# Patient Record
Sex: MALE | Race: WHITE | Employment: FULL TIME | URBAN - METROPOLITAN AREA
[De-identification: names, ages, dates, MRNs, and addresses within clinical notes are randomized per-mention and may not be internally consistent; named-entity substitution may affect disease eponyms.]

---

## 2018-09-11 ENCOUNTER — OFFICE VISIT (OUTPATIENT)
Dept: SLEEP CENTER | Facility: HOSPITAL | Age: 48
End: 2018-09-11
Attending: INTERNAL MEDICINE
Payer: COMMERCIAL

## 2018-09-11 DIAGNOSIS — R06.81 APNEA: ICD-10-CM

## 2018-09-11 PROCEDURE — 95810 POLYSOM 6/> YRS 4/> PARAM: CPT

## 2018-09-24 NOTE — PROCEDURES
[address]       Accredited by the [facility] ([facility])    PATIENT'S NAME:        Pamela Horan  ATTENDING PHYSICIAN:   Bharti Burrell M.D.   REFERRING PHYSICIAN:   Dr. Marah Villanueva During sleep, all stages of sleep were seen. Slow-wave sleep comprised 45.2% of total sleep time. REM sleep occupied 11.6% of total sleep time with a REM latency of 231.9 minutes. There were a few awakenings during the night.     Sleep-disordered breathi vigilance. Thank you for your confidence in the [facility]. If you have any questions, please feel free to contact us at [phone].     Dictated By Sadie Mustafa M.D.  d: 09/23/2018 20:31:56  t: 09/23/2018 20:37:58  Job [id number]  NS

## 2018-11-12 ENCOUNTER — OFFICE VISIT (OUTPATIENT)
Dept: SLEEP CENTER | Facility: HOSPITAL | Age: 48
End: 2018-11-12
Attending: INTERNAL MEDICINE
Payer: COMMERCIAL

## 2018-11-12 DIAGNOSIS — R06.81 APNEA: ICD-10-CM

## 2018-11-12 PROCEDURE — 95811 POLYSOM 6/>YRS CPAP 4/> PARM: CPT

## 2018-11-19 NOTE — PROCEDURES
[address] [address]       Accredited by the [facility] ([facility]ASM)    PATIENT'S NAME:        Eleanora Bumpers  ATTENDING PHYSICIAN:   Sundar Rooney M.D.   REFERRING PHYSICIAN:   Dr. Joyce Pizarro documented via technician notes every 15 minutes. There is an additional channel for measurement of CPAP flow for the titration of positive airway pressure.     SLEEP ARCHITECTURE:  Total sleep time 430 minutes, total sleep period 430 minutes, with sleep ef used with level 3 humidity and C-flex level 2. Close followup to assure adequate treatment of obstructive sleep apnea and adequate compliance is recommended.       Patient will benefit from a structured weight loss program.    Patient will follow up wi

## 2019-06-16 ENCOUNTER — APPOINTMENT (OUTPATIENT)
Dept: CT IMAGING | Facility: HOSPITAL | Age: 49
End: 2019-06-16
Attending: EMERGENCY MEDICINE
Payer: COMMERCIAL

## 2019-06-16 ENCOUNTER — HOSPITAL ENCOUNTER (OUTPATIENT)
Age: 49
Discharge: ANOTHER HOSPITAL | End: 2019-06-16
Attending: FAMILY MEDICINE
Payer: COMMERCIAL

## 2019-06-16 ENCOUNTER — HOSPITAL ENCOUNTER (EMERGENCY)
Facility: HOSPITAL | Age: 49
Discharge: HOME | End: 2019-06-16
Attending: EMERGENCY MEDICINE
Payer: COMMERCIAL

## 2019-06-16 ENCOUNTER — APPOINTMENT (OUTPATIENT)
Dept: GENERAL RADIOLOGY | Age: 49
End: 2019-06-16
Attending: FAMILY MEDICINE
Payer: COMMERCIAL

## 2019-06-16 VITALS
HEIGHT: 71 IN | BODY MASS INDEX: 37.94 KG/M2 | DIASTOLIC BLOOD PRESSURE: 83 MMHG | HEART RATE: 87 BPM | TEMPERATURE: 97 F | OXYGEN SATURATION: 97 % | WEIGHT: 271 LBS | RESPIRATION RATE: 20 BRPM | SYSTOLIC BLOOD PRESSURE: 132 MMHG

## 2019-06-16 VITALS
RESPIRATION RATE: 16 BRPM | WEIGHT: 271 LBS | SYSTOLIC BLOOD PRESSURE: 138 MMHG | DIASTOLIC BLOOD PRESSURE: 84 MMHG | HEART RATE: 83 BPM | TEMPERATURE: 99 F | HEIGHT: 71 IN | BODY MASS INDEX: 37.94 KG/M2 | OXYGEN SATURATION: 98 %

## 2019-06-16 DIAGNOSIS — R13.10 DYSPHAGIA, UNSPECIFIED TYPE: Primary | ICD-10-CM

## 2019-06-16 DIAGNOSIS — R19.8 SENSATION OF FOREIGN BODY IN ESOPHAGUS: Primary | ICD-10-CM

## 2019-06-16 PROCEDURE — 99203 OFFICE O/P NEW LOW 30 MIN: CPT

## 2019-06-16 PROCEDURE — 96361 HYDRATE IV INFUSION ADD-ON: CPT

## 2019-06-16 PROCEDURE — 85025 COMPLETE CBC W/AUTO DIFF WBC: CPT | Performed by: EMERGENCY MEDICINE

## 2019-06-16 PROCEDURE — 70491 CT SOFT TISSUE NECK W/DYE: CPT | Performed by: EMERGENCY MEDICINE

## 2019-06-16 PROCEDURE — 80053 COMPREHEN METABOLIC PANEL: CPT | Performed by: EMERGENCY MEDICINE

## 2019-06-16 PROCEDURE — 99285 EMERGENCY DEPT VISIT HI MDM: CPT

## 2019-06-16 PROCEDURE — 99213 OFFICE O/P EST LOW 20 MIN: CPT

## 2019-06-16 PROCEDURE — 96360 HYDRATION IV INFUSION INIT: CPT

## 2019-06-16 PROCEDURE — 70360 X-RAY EXAM OF NECK: CPT | Performed by: FAMILY MEDICINE

## 2019-06-16 PROCEDURE — 99284 EMERGENCY DEPT VISIT MOD MDM: CPT

## 2019-06-16 RX ORDER — OMEPRAZOLE 20 MG/1
20 CAPSULE, DELAYED RELEASE ORAL DAILY
Qty: 30 CAPSULE | Refills: 0 | Status: SHIPPED | OUTPATIENT
Start: 2019-06-16 | End: 2019-07-16

## 2019-06-16 RX ORDER — TAMSULOSIN HYDROCHLORIDE 0.4 MG/1
0.4 CAPSULE ORAL DAILY
COMMUNITY

## 2019-06-16 NOTE — ED INITIAL ASSESSMENT (HPI)
Patient sent from [address] [place] [address] for further work up of soft tissue swelling to neck. States around 3 weeks ago developed feeling of something stuck in throat after eating popcorn. Had similar feeling today after eating chips/burger.  Patient has been able to swallow w

## 2019-06-16 NOTE — ED INITIAL ASSESSMENT (HPI)
Pt presents today with c/o possible food stuck in back of throat. Pt states that he ate popcorn x 3 weeks ago and felt a piece had not gone down all the way. Pt has continued to feel the piece stuck in the back of his throat since that time.  Pt states that

## 2019-06-16 NOTE — ED PROVIDER NOTES
Patient Seen in: [facility] Emergency Department    History   Patient presents with:  FB in Throat (GI, respiratory)    Stated Complaint: burger and chips stuck in throat.  airway patent    HPI    Pleasant 61-year-old presents to the emergency depart 135/80   Pulse 83   Resp 18   Temp 98.5 °F (36.9 °C)   Temp src Temporal   SpO2 99 %   O2 Device None (Room air)       Current:/84   Pulse 83   Temp 98.5 °F (36.9 °C) (Temporal)   Resp 16   Ht 180.3 cm (5' 11\")   Wt 122.9 kg   SpO2 98%   BMI 37.80 k like more of an esophageal stricture    MDM   CT is benign. We will schedule him to follow-up with gastroenterology for likely endoscopy for symptoms.   I have discussed with the patient he is quite stable here in the emergency department no further interv

## 2019-06-16 NOTE — ED PROVIDER NOTES
Patient Seen in: [facility]    History   Patient presents with:  Choking (respiratory)    Stated Complaint: Food caught in back of throat    HPI    61-year-old male presents with complaints of food stuck in the back of his throa no cervical lymphadenopathy.   Lungs clear to auscultation bilaterally  Heart S1-S2 heard no murmurs no gallops  Skin shows no rash        ED Course   Labs Reviewed - No data to display       Xr Soft Tissue Neck (cpt=70360)    Result Date: 6/16/2019  PROCED

## 2019-06-17 NOTE — ED NOTES
Report given to Clarisse Caputo RN at this time. Patient has returned from CT at this time. Assisted to bathroom. Ambulatory with steady gait.

## 2019-06-19 PROBLEM — E11.9 CONTROLLED TYPE 2 DIABETES MELLITUS WITHOUT COMPLICATION, WITHOUT LONG-TERM CURRENT USE OF INSULIN (HCC): Status: ACTIVE | Noted: 2018-09-28

## 2019-06-19 PROBLEM — R13.19 ESOPHAGEAL DYSPHAGIA: Status: ACTIVE | Noted: 2019-06-19

## 2019-06-19 PROBLEM — R39.9 LOWER URINARY TRACT SYMPTOMS (LUTS): Status: ACTIVE | Noted: 2018-09-28

## 2019-06-19 PROBLEM — G47.33 OSA (OBSTRUCTIVE SLEEP APNEA): Status: ACTIVE | Noted: 2017-09-27

## 2019-06-20 RX ORDER — MULTIVIT WITH MINERALS/LUTEIN
1000 TABLET ORAL DAILY
COMMUNITY

## 2019-07-02 ENCOUNTER — ANESTHESIA EVENT (OUTPATIENT)
Dept: ENDOSCOPY | Facility: HOSPITAL | Age: 49
End: 2019-07-02
Payer: COMMERCIAL

## 2019-07-02 ENCOUNTER — HOSPITAL ENCOUNTER (OUTPATIENT)
Facility: HOSPITAL | Age: 49
Setting detail: HOSPITAL OUTPATIENT SURGERY
Discharge: HOME | End: 2019-07-02
Attending: INTERNAL MEDICINE | Admitting: INTERNAL MEDICINE
Payer: COMMERCIAL

## 2019-07-02 ENCOUNTER — ANESTHESIA (OUTPATIENT)
Dept: ENDOSCOPY | Facility: HOSPITAL | Age: 49
End: 2019-07-02
Payer: COMMERCIAL

## 2019-07-02 VITALS
RESPIRATION RATE: 18 BRPM | HEART RATE: 78 BPM | SYSTOLIC BLOOD PRESSURE: 126 MMHG | TEMPERATURE: 98 F | WEIGHT: 275 LBS | OXYGEN SATURATION: 99 % | BODY MASS INDEX: 38.5 KG/M2 | DIASTOLIC BLOOD PRESSURE: 66 MMHG | HEIGHT: 71 IN

## 2019-07-02 DIAGNOSIS — R13.19 ESOPHAGEAL DYSPHAGIA: ICD-10-CM

## 2019-07-02 LAB — GLUCOSE BLD-MCNC: 125 MG/DL (ref 70–99)

## 2019-07-02 PROCEDURE — 88342 IMHCHEM/IMCYTCHM 1ST ANTB: CPT | Performed by: INTERNAL MEDICINE

## 2019-07-02 PROCEDURE — 0DB78ZX EXCISION OF STOMACH, PYLORUS, VIA NATURAL OR ARTIFICIAL OPENING ENDOSCOPIC, DIAGNOSTIC: ICD-10-PCS | Performed by: INTERNAL MEDICINE

## 2019-07-02 PROCEDURE — 0DB58ZX EXCISION OF ESOPHAGUS, VIA NATURAL OR ARTIFICIAL OPENING ENDOSCOPIC, DIAGNOSTIC: ICD-10-PCS | Performed by: INTERNAL MEDICINE

## 2019-07-02 PROCEDURE — 88305 TISSUE EXAM BY PATHOLOGIST: CPT | Performed by: INTERNAL MEDICINE

## 2019-07-02 PROCEDURE — 0DB98ZX EXCISION OF DUODENUM, VIA NATURAL OR ARTIFICIAL OPENING ENDOSCOPIC, DIAGNOSTIC: ICD-10-PCS | Performed by: INTERNAL MEDICINE

## 2019-07-02 PROCEDURE — 82962 GLUCOSE BLOOD TEST: CPT

## 2019-07-02 PROCEDURE — 0DB48ZX EXCISION OF ESOPHAGOGASTRIC JUNCTION, VIA NATURAL OR ARTIFICIAL OPENING ENDOSCOPIC, DIAGNOSTIC: ICD-10-PCS | Performed by: INTERNAL MEDICINE

## 2019-07-02 RX ORDER — NALOXONE HYDROCHLORIDE 0.4 MG/ML
80 INJECTION, SOLUTION INTRAMUSCULAR; INTRAVENOUS; SUBCUTANEOUS AS NEEDED
Status: DISCONTINUED | OUTPATIENT
Start: 2019-07-02 | End: 2019-07-02

## 2019-07-02 RX ORDER — DEXTROSE MONOHYDRATE 25 G/50ML
50 INJECTION, SOLUTION INTRAVENOUS
Status: DISCONTINUED | OUTPATIENT
Start: 2019-07-02 | End: 2019-07-02

## 2019-07-02 RX ORDER — SODIUM CHLORIDE, SODIUM LACTATE, POTASSIUM CHLORIDE, CALCIUM CHLORIDE 600; 310; 30; 20 MG/100ML; MG/100ML; MG/100ML; MG/100ML
INJECTION, SOLUTION INTRAVENOUS CONTINUOUS
Status: DISCONTINUED | OUTPATIENT
Start: 2019-07-02 | End: 2019-07-02

## 2019-07-02 NOTE — OPERATIVE REPORT
Shauna Barillas Patient Status:  Hospital Outpatient Surgery    1970 MRN [id number]   [facility] ENDOSCOPY Attending Severo Posey, MD   Date 2019 PCP Venessa Nova MD     PREOPERATIVE DIAGNOSIS/INDICATION: OPD  POSTOPERTATIVE

## 2019-07-02 NOTE — ANESTHESIA PREPROCEDURE EVALUATION
PRE-OP EVALUATION    Patient Name: Suukmar Alonzo    Pre-op Diagnosis: Esophageal dysphagia [R13.10]    Procedure(s):  ESOPHAGOGASTRODUODENOSCOPY    Surgeon(s) and Role:     Lyle Terry MD - Primary    Pre-op vitals reviewed.   Temp: 98.1 °F (36.7 removed from center of back    • OTHER      Sedated to set nasal fracture     Social History    Tobacco Use      Smoking status: Never Smoker      Smokeless tobacco: Never Used    Alcohol use: Never      Frequency: Never      Drug use: Unknown     Availabl

## 2019-07-02 NOTE — H&P
[address] Patient Status:  Hospital Outpatient Surgery    1970 MRN [id number]   [facility] ENDOSCOPY Attending Idalmis Xiong MD   Date 2019 PCP Julianne Frye MD     CC: Dysphagia    Hist Normal.  CV: S1 and S2 normal.    Lungs: Clear to auscultation. Abdomen: Soft and nondistended. Nontender. No masses. Bowel sounds are present. Extremities: No edema, cyanosis, or clubbing. Skin: Warm and dry.   Laboratory Data:  Lab Results   Compone

## 2019-07-02 NOTE — ANESTHESIA POSTPROCEDURE EVALUATION
[address] Patient Status:  Hospital Outpatient Surgery   Age/Gender 57year old male MRN [id number]   Location [facility]. Attending Kelley Robles, [address] [facility] [address] Day # 0 PCP Romayne Gilles, MD       Anesthesia Post-op Note

## 2021-01-20 ENCOUNTER — HOSPITAL ENCOUNTER (EMERGENCY)
Facility: HOSPITAL | Age: 51
Discharge: HOME | End: 2021-01-20
Attending: EMERGENCY MEDICINE
Payer: COMMERCIAL

## 2021-01-20 ENCOUNTER — APPOINTMENT (OUTPATIENT)
Dept: CT IMAGING | Facility: HOSPITAL | Age: 51
End: 2021-01-20
Attending: EMERGENCY MEDICINE
Payer: COMMERCIAL

## 2021-01-20 VITALS
RESPIRATION RATE: 16 BRPM | HEIGHT: 72 IN | BODY MASS INDEX: 24.24 KG/M2 | DIASTOLIC BLOOD PRESSURE: 72 MMHG | HEART RATE: 78 BPM | TEMPERATURE: 99 F | WEIGHT: 179 LBS | OXYGEN SATURATION: 91 % | SYSTOLIC BLOOD PRESSURE: 135 MMHG

## 2021-01-20 DIAGNOSIS — N20.0 KIDNEY STONE: Primary | ICD-10-CM

## 2021-01-20 LAB
ALBUMIN SERPL-MCNC: 3.9 G/DL (ref 3.4–5)
ALBUMIN/GLOB SERPL: 1 {RATIO} (ref 1–2)
ALP LIVER SERPL-CCNC: 82 U/L
ALT SERPL-CCNC: 45 U/L
ANION GAP SERPL CALC-SCNC: 6 MMOL/L (ref 0–18)
AST SERPL-CCNC: 21 U/L (ref 15–37)
BASOPHILS # BLD AUTO: 0.03 X10(3) UL (ref 0–0.2)
BASOPHILS NFR BLD AUTO: 0.3 %
BILIRUB SERPL-MCNC: 0.3 MG/DL (ref 0.1–2)
BILIRUB UR QL STRIP.AUTO: NEGATIVE
BUN BLD-MCNC: 15 MG/DL (ref 7–18)
BUN/CREAT SERPL: 12 (ref 10–20)
CALCIUM BLD-MCNC: 9.2 MG/DL (ref 8.5–10.1)
CHLORIDE SERPL-SCNC: 104 MMOL/L (ref 98–112)
CLARITY UR REFRACT.AUTO: CLEAR
CO2 SERPL-SCNC: 25 MMOL/L (ref 21–32)
COLOR UR AUTO: YELLOW
CREAT BLD-MCNC: 1.25 MG/DL
DEPRECATED RDW RBC AUTO: 37.7 FL (ref 35.1–46.3)
EOSINOPHIL # BLD AUTO: 0.06 X10(3) UL (ref 0–0.7)
EOSINOPHIL NFR BLD AUTO: 0.6 %
ERYTHROCYTE [DISTWIDTH] IN BLOOD BY AUTOMATED COUNT: 13.2 % (ref 11–15)
GLOBULIN PLAS-MCNC: 3.8 G/DL (ref 2.8–4.4)
GLUCOSE BLD-MCNC: 200 MG/DL (ref 70–99)
GLUCOSE UR STRIP.AUTO-MCNC: >=500 MG/DL
HCT VFR BLD AUTO: 44 %
HGB BLD-MCNC: 14.7 G/DL
IMM GRANULOCYTES # BLD AUTO: 0.04 X10(3) UL (ref 0–1)
IMM GRANULOCYTES NFR BLD: 0.4 %
LEUKOCYTE ESTERASE UR QL STRIP.AUTO: NEGATIVE
LIPASE SERPL-CCNC: 211 U/L (ref 73–393)
LYMPHOCYTES # BLD AUTO: 1 X10(3) UL (ref 1–4)
LYMPHOCYTES NFR BLD AUTO: 9.8 %
M PROTEIN MFR SERPL ELPH: 7.7 G/DL (ref 6.4–8.2)
MCH RBC QN AUTO: 26.9 PG (ref 26–34)
MCHC RBC AUTO-ENTMCNC: 33.4 G/DL (ref 31–37)
MCV RBC AUTO: 80.4 FL
MONOCYTES # BLD AUTO: 0.7 X10(3) UL (ref 0.1–1)
MONOCYTES NFR BLD AUTO: 6.9 %
NEUTROPHILS # BLD AUTO: 8.38 X10 (3) UL (ref 1.5–7.7)
NEUTROPHILS # BLD AUTO: 8.38 X10(3) UL (ref 1.5–7.7)
NEUTROPHILS NFR BLD AUTO: 82 %
NITRITE UR QL STRIP.AUTO: NEGATIVE
OSMOLALITY SERPL CALC.SUM OF ELEC: 286 MOSM/KG (ref 275–295)
PH UR STRIP.AUTO: 6 [PH] (ref 4.5–8)
PLATELET # BLD AUTO: 198 10(3)UL (ref 150–450)
POTASSIUM SERPL-SCNC: 4.1 MMOL/L (ref 3.5–5.1)
PROT UR STRIP.AUTO-MCNC: NEGATIVE MG/DL
RBC # BLD AUTO: 5.47 X10(6)UL
SODIUM SERPL-SCNC: 135 MMOL/L (ref 136–145)
SP GR UR STRIP.AUTO: 1.01 (ref 1–1.03)
UROBILINOGEN UR STRIP.AUTO-MCNC: <2 MG/DL
WBC # BLD AUTO: 10.2 X10(3) UL (ref 4–11)

## 2021-01-20 PROCEDURE — 85025 COMPLETE CBC W/AUTO DIFF WBC: CPT | Performed by: EMERGENCY MEDICINE

## 2021-01-20 PROCEDURE — 96375 TX/PRO/DX INJ NEW DRUG ADDON: CPT

## 2021-01-20 PROCEDURE — 99284 EMERGENCY DEPT VISIT MOD MDM: CPT

## 2021-01-20 PROCEDURE — 83690 ASSAY OF LIPASE: CPT | Performed by: EMERGENCY MEDICINE

## 2021-01-20 PROCEDURE — 80053 COMPREHEN METABOLIC PANEL: CPT | Performed by: EMERGENCY MEDICINE

## 2021-01-20 PROCEDURE — 96374 THER/PROPH/DIAG INJ IV PUSH: CPT

## 2021-01-20 PROCEDURE — 81001 URINALYSIS AUTO W/SCOPE: CPT | Performed by: EMERGENCY MEDICINE

## 2021-01-20 PROCEDURE — 74176 CT ABD & PELVIS W/O CONTRAST: CPT | Performed by: EMERGENCY MEDICINE

## 2021-01-20 PROCEDURE — 99285 EMERGENCY DEPT VISIT HI MDM: CPT

## 2021-01-20 RX ORDER — GLIPIZIDE 10 MG/1
10 TABLET, FILM COATED, EXTENDED RELEASE ORAL DAILY
COMMUNITY

## 2021-01-20 RX ORDER — ONDANSETRON 4 MG/1
4 TABLET, ORALLY DISINTEGRATING ORAL EVERY 4 HOURS PRN
Qty: 20 TABLET | Refills: 0 | Status: SHIPPED | OUTPATIENT
Start: 2021-01-20 | End: 2021-01-25

## 2021-01-20 RX ORDER — ONDANSETRON 2 MG/ML
4 INJECTION INTRAMUSCULAR; INTRAVENOUS ONCE
Status: COMPLETED | OUTPATIENT
Start: 2021-01-20 | End: 2021-01-20

## 2021-01-20 RX ORDER — KETOROLAC TROMETHAMINE 30 MG/ML
15 INJECTION, SOLUTION INTRAMUSCULAR; INTRAVENOUS ONCE
Status: COMPLETED | OUTPATIENT
Start: 2021-01-20 | End: 2021-01-20

## 2021-01-20 RX ORDER — HYDROCODONE BITARTRATE AND ACETAMINOPHEN 5; 325 MG/1; MG/1
1-2 TABLET ORAL EVERY 6 HOURS PRN
Qty: 20 TABLET | Refills: 0 | Status: SHIPPED | OUTPATIENT
Start: 2021-01-20 | End: 2021-01-23

## 2021-01-20 RX ORDER — HYDROMORPHONE HYDROCHLORIDE 1 MG/ML
1 INJECTION, SOLUTION INTRAMUSCULAR; INTRAVENOUS; SUBCUTANEOUS EVERY 30 MIN PRN
Status: DISCONTINUED | OUTPATIENT
Start: 2021-01-20 | End: 2021-01-20

## 2021-01-20 NOTE — ED PROVIDER NOTES
Patient Seen in: [facility] Emergency Department      History   Patient presents with:  Abdomen/Flank Pain    Stated Complaint: flank pain, possible stone    HPI/Subjective:   HPI    Patient is a 53-year-old male comes emergency room for evaluation rhonchi. CARDIOVASCULAR: Regular rate and rhythm. Normal S1S2. No S3S4 or murmur. ABDOMEN: Bowel sounds are present. Soft. nondistended, no pulsatile masses. nontender  Back: Patient has no CVA tenderness.   Mild tenderness to the mid axillary line righ ABDOMEN+PELVIS KIDNEYSTONE 2D RNDR(NO IV,NO ORAL)(CPT=74176)  COMPARISON:  [facility] , CT, CT ABD/PLV(S) Rosaura Part W/3D, 4/12/2010, 3:01 PM.  INDICATIONS:  flank pain, possible stone  TECHNIQUE:  Unenhanced multislice CT scanning from above the kidneys to b throughout the right kidney are noted. A representative 9 mm calculus in the upper pole the right kidney is noted.     Dictated by (CST): Diane Carty MD on 1/20/2021 at 10:55 AM     Finalized by (CST): Diane Carty MD on 1/20/2021 at 10:57 AM primary care provider for further evaluation and treatment, but to return immediately to the ER for worsening, concerning, new, changing or persisting symptoms. I discussed the case with the patient and they had no questions, complaints, or concerns.   Juhi Decker

## 2022-01-03 ENCOUNTER — APPOINTMENT (OUTPATIENT)
Dept: GENERAL RADIOLOGY | Age: 52
End: 2022-01-03
Attending: EMERGENCY MEDICINE
Payer: COMMERCIAL

## 2022-01-03 ENCOUNTER — HOSPITAL ENCOUNTER (OUTPATIENT)
Age: 52
Discharge: HOME | End: 2022-01-03
Attending: EMERGENCY MEDICINE
Payer: COMMERCIAL

## 2022-01-03 VITALS
RESPIRATION RATE: 20 BRPM | SYSTOLIC BLOOD PRESSURE: 130 MMHG | OXYGEN SATURATION: 99 % | WEIGHT: 285 LBS | TEMPERATURE: 98 F | HEIGHT: 72 IN | HEART RATE: 78 BPM | BODY MASS INDEX: 38.6 KG/M2 | DIASTOLIC BLOOD PRESSURE: 80 MMHG

## 2022-01-03 DIAGNOSIS — B34.9 VIRAL SYNDROME: Primary | ICD-10-CM

## 2022-01-03 PROCEDURE — 71046 X-RAY EXAM CHEST 2 VIEWS: CPT | Performed by: EMERGENCY MEDICINE

## 2022-01-03 PROCEDURE — 99213 OFFICE O/P EST LOW 20 MIN: CPT

## 2022-01-03 NOTE — ED INITIAL ASSESSMENT (HPI)
Pt aox4. Pt c/o sough stared last Monday. Pt states had fatigue and loss of appetite weds and Thursday. Pt denies fatigue and loss of appetite.  Pt vaccinated for Covid

## 2022-01-03 NOTE — ED PROVIDER NOTES
Patient Seen in: [facility]      History   Patient presents with:  Cough/URI    Stated Complaint: Severe Cough    Subjective:   HPI    Patient is a pleasant 24-year-old male, presenting for evaluation of a persistent cough.     Patient bec Head is without evidence of trauma. Extraocular muscles are intact. Pupils are equal and reactive to light. Oropharynx is pink and moist.  NECK: Neck is supple and nontender. The trachea is midline.    LUNGS: Lungs are clear to auscultation bilaterally, res intervention may be required, depending on the patient's clinical course. Patient verbalizes understanding and is comfortable with the plan as recommended. Patient ambulated freely and was subsequently discharged without incident.

## 2022-01-05 LAB — SARS-COV-2 RNA RESP QL NAA+PROBE: NOT DETECTED

## 2022-02-21 ENCOUNTER — CLINICAL ABSTRACT (OUTPATIENT)
Dept: PHARMACY | Age: 52
End: 2022-02-21

## 2022-02-23 ENCOUNTER — CLINICAL ABSTRACT (OUTPATIENT)
Dept: PHARMACY | Age: 52
End: 2022-02-23

## 2022-02-28 ENCOUNTER — TELEPHONE (OUTPATIENT)
Dept: PHARMACY | Age: 52
End: 2022-02-28

## 2022-03-01 RX ORDER — TAMSULOSIN HYDROCHLORIDE 0.4 MG/1
0.4 CAPSULE ORAL DAILY
COMMUNITY

## 2022-03-01 RX ORDER — TRIAMCINOLONE ACETONIDE 1 MG/G
OINTMENT TOPICAL 2 TIMES DAILY
COMMUNITY

## 2022-03-01 RX ORDER — DIAPER,BRIEF,INFANT-TODD,DISP
EACH MISCELLANEOUS 2 TIMES DAILY
COMMUNITY

## 2022-03-25 ENCOUNTER — CLINICAL ABSTRACT (OUTPATIENT)
Dept: PHARMACY | Age: 52
End: 2022-03-25

## 2022-04-25 ENCOUNTER — CLINICAL ABSTRACT (OUTPATIENT)
Dept: PHARMACY | Age: 52
End: 2022-04-25

## 2022-04-26 ENCOUNTER — TELEPHONE (OUTPATIENT)
Dept: PHARMACY | Age: 52
End: 2022-04-26

## 2022-05-20 ENCOUNTER — CLINICAL ABSTRACT (OUTPATIENT)
Dept: PHARMACY | Age: 52
End: 2022-05-20

## 2022-05-23 ENCOUNTER — TELEPHONE (OUTPATIENT)
Dept: PHARMACY | Age: 52
End: 2022-05-23

## 2022-06-22 ENCOUNTER — CLINICAL ABSTRACT (OUTPATIENT)
Dept: PHARMACY | Age: 52
End: 2022-06-22

## 2022-07-05 ENCOUNTER — CLINICAL ABSTRACT (OUTPATIENT)
Dept: PHARMACY | Age: 52
End: 2022-07-05

## 2022-07-21 ENCOUNTER — TELEPHONE (OUTPATIENT)
Dept: PHARMACY | Age: 52
End: 2022-07-21

## 2022-08-18 ENCOUNTER — TELEPHONE (OUTPATIENT)
Dept: PHARMACY | Age: 52
End: 2022-08-18

## 2022-08-18 DIAGNOSIS — L40.9 PSORIASIS: Primary | ICD-10-CM

## 2022-08-18 DIAGNOSIS — K74.60 CIRRHOSIS (CMD): ICD-10-CM

## 2022-09-09 ENCOUNTER — CLINICAL ABSTRACT (OUTPATIENT)
Dept: PHARMACY | Age: 52
End: 2022-09-09

## 2022-09-12 ENCOUNTER — TELEPHONE (OUTPATIENT)
Dept: PHARMACY | Age: 52
End: 2022-09-12

## 2022-09-30 ENCOUNTER — CLINICAL ABSTRACT (OUTPATIENT)
Dept: PHARMACY | Age: 52
End: 2022-09-30

## 2022-10-06 ENCOUNTER — TELEPHONE (OUTPATIENT)
Dept: PHARMACY | Age: 52
End: 2022-10-06

## 2022-10-25 ENCOUNTER — CLINICAL ABSTRACT (OUTPATIENT)
Dept: PHARMACY | Age: 52
End: 2022-10-25

## 2022-10-28 ENCOUNTER — TELEPHONE (OUTPATIENT)
Dept: PHARMACY | Age: 52
End: 2022-10-28

## 2022-11-18 ENCOUNTER — CLINICAL ABSTRACT (OUTPATIENT)
Dept: PHARMACY | Age: 52
End: 2022-11-18

## 2022-11-29 ENCOUNTER — TELEPHONE (OUTPATIENT)
Dept: PHARMACY | Age: 52
End: 2022-11-29

## 2022-12-08 ENCOUNTER — CLINICAL ABSTRACT (OUTPATIENT)
Dept: PHARMACY | Age: 52
End: 2022-12-08

## 2022-12-09 ENCOUNTER — CLINICAL ABSTRACT (OUTPATIENT)
Dept: PHARMACY | Age: 52
End: 2022-12-09

## 2022-12-09 ENCOUNTER — TELEPHONE (OUTPATIENT)
Dept: PHARMACY | Age: 52
End: 2022-12-09

## 2022-12-29 ENCOUNTER — CLINICAL ABSTRACT (OUTPATIENT)
Dept: PHARMACY | Age: 52
End: 2022-12-29

## 2023-01-03 ENCOUNTER — TELEPHONE (OUTPATIENT)
Dept: PHARMACY | Age: 53
End: 2023-01-03

## 2023-01-12 LAB — HBA1C MFR BLD: 6.6 %

## 2023-01-20 ENCOUNTER — CLINICAL ABSTRACT (OUTPATIENT)
Dept: PHARMACY | Age: 53
End: 2023-01-20

## 2023-01-25 ENCOUNTER — TELEPHONE (OUTPATIENT)
Dept: PHARMACY | Age: 53
End: 2023-01-25

## 2023-02-13 ENCOUNTER — TELEPHONE (OUTPATIENT)
Dept: PHARMACY | Age: 53
End: 2023-02-13

## 2023-06-15 ENCOUNTER — CLINICAL ABSTRACT (OUTPATIENT)
Dept: CARE COORDINATION | Age: 53
End: 2023-06-15

## 2024-01-23 ENCOUNTER — OFFICE VISIT (OUTPATIENT)
Facility: CLINIC | Age: 54
End: 2024-01-23
Payer: COMMERCIAL

## 2024-01-23 VITALS
DIASTOLIC BLOOD PRESSURE: 80 MMHG | OXYGEN SATURATION: 99 % | RESPIRATION RATE: 18 BRPM | BODY MASS INDEX: 37.65 KG/M2 | WEIGHT: 278 LBS | HEART RATE: 102 BPM | HEIGHT: 72 IN | SYSTOLIC BLOOD PRESSURE: 128 MMHG

## 2024-01-23 DIAGNOSIS — E11.9 CONTROLLED TYPE 2 DIABETES MELLITUS WITHOUT COMPLICATION, WITHOUT LONG-TERM CURRENT USE OF INSULIN (HCC): ICD-10-CM

## 2024-01-23 DIAGNOSIS — G47.10 HYPERSOMNIA: ICD-10-CM

## 2024-01-23 DIAGNOSIS — G47.33 OSA (OBSTRUCTIVE SLEEP APNEA): Primary | ICD-10-CM

## 2024-01-23 DIAGNOSIS — E66.09 CLASS 2 OBESITY DUE TO EXCESS CALORIES WITHOUT SERIOUS COMORBIDITY WITH BODY MASS INDEX (BMI) OF 37.0 TO 37.9 IN ADULT: ICD-10-CM

## 2024-01-23 PROCEDURE — 99204 OFFICE O/P NEW MOD 45 MIN: CPT | Performed by: OTHER

## 2024-01-23 PROCEDURE — 3074F SYST BP LT 130 MM HG: CPT | Performed by: OTHER

## 2024-01-23 PROCEDURE — 3079F DIAST BP 80-89 MM HG: CPT | Performed by: OTHER

## 2024-01-23 PROCEDURE — 3008F BODY MASS INDEX DOCD: CPT | Performed by: OTHER

## 2024-01-23 NOTE — PROGRESS NOTES
[facility] PULMONARY  SLEEP PROGRESS NOTE        HPI:   This is a 53 year old male coming in for   Chief Complaint   Patient presents with    Obstructive Sleep Apnea (ANIL)     DME:[employer]  MASK:full mask  DEVICE;over 5 yrs        HPI:     This is a 53 year old male who presents with the following symptoms, risk factors, behaviors or other items associated with sleep problems.    Sleep Apnea:   No data recorded  Insomnia:  No data recorded  Restless Leg:  No data recorded  Parasomnias:   No data recorded  Daytime Problems:  No data recorded    Patient underwent diagnostic polysomnography on 2018.  This study showed severe obstructive sleep apnea syndrome, with overall apnea-hypopnea index of 47 events per hour, supine index 107 events per hour, REM index 67 events per hour, with mary O2 saturation 81%.  Also elevated periodic limb movements of sleep were noted, but PLMS were not associated with arousals.         The patient's [place] Sleepiness score is No data recorded/.  Sleepiness persists, did improve at some point  He is a diabetic  Hose does not stay and leak is high  Wt is stable at 278  He is recently changed to CHEPE Quintana  10/25/2023 - 2024  Patient ID: [id number]  : 1970  Age: 53 years  [facility]  Compliance Report  Usage 10/25/2023 - 2024  Usage days 90/90 days (100%)  >= 4 hours 85 days (94%)  < 4 hours 5 days (6%)  Usage hours 504 hours 35 minutes  Average usage (total days) 5 hours 36 minutes  Average usage (days used) 5 hours 36 minutes  Median usage (days used) 5 hours 19 minutes  AirSense 10 AutoSet  Serial number [id number]  Mode CPAP  Set pressure 12 cmH2O  EPR Fulltime  EPR level 2  Therapy  Leaks - L/min Median: 79.1 95th percentile: 104.9 Maximum: 113.6  Events per hour AI: 3.8 HI: 0.3 AHI: 4.1  Apnea Index Central: 0.0 Obstructive: 0.2 Unknown: 3.5  RERA Index 0.9  Cheyne-Talamantes respiration (average duration per night) 0 minutes (0%)    Sleep study  - 11/12/2018  DME: [facility]  MASK:         Patient: Sleep review of systems today: see form.      Pt  PCP:  Gretchen Acosta MD  No referring provider defined for this encounter.           No data to display                    Past Medical History:   Diagnosis Date    Broken nose 1989    Diabetes (HCC)     Diabetes mellitus (HCC)     Eye disease     Head trauma 1982    Kidney calculi     Melanoma (HCC)     back    Myopic degeneration, bilateral dx 2014    Sleep apnea     Stress     Wears glasses      Past Surgical History:   Procedure Laterality Date    HERNIA SURGERY      OTHER      Melanoma removed from center of back     OTHER      Sedated to set nasal fracture     Social History:  Social History     Social History Narrative    Not on file     Social History     Socioeconomic History    Marital status:    Tobacco Use    Smoking status: Never    Smokeless tobacco: Never   Substance and Sexual Activity    Alcohol use: Never    Drug use: Never     Family History:  Family History   Problem Relation Age of Onset    Hypertension Father     Breast Cancer Sister      Allergies:  Allergies   Allergen Reactions    Penicillins UNKNOWN    Bacitracin RASH    Balsam RASH     Balsam of [place]    Sodium Metabisulfite RASH     Current Meds:  Current Outpatient Medications   Medication Sig Dispense Refill    glipiZIDE ER 10 MG Oral Tablet 24 Hr Take 1 tablet (10 mg total) by mouth daily.      GLIPIZIDE OR Take by mouth.      Glucose Blood (BLOOD GLUCOSE TEST) In Vitro Strip 1 each by Does not apply route.      METFORMIN HCL OR Take 750 mg by mouth 2 (two) times daily. 2 tabs in the am and I tab at dinner       tamsulosin HCl 0.4 MG Oral Cap Take 1 capsule (0.4 mg total) by mouth daily.      Apremilast (OTEZLA OR) Take by mouth.      HYDROcodone-acetaminophen (NORCO) 5-325 MG Oral Tab Take 1 tablet by mouth every 6 (six) hours as needed for Pain. (Patient not taking: Reported on 1/3/2022) 15 tablet 0    HYDROcodone-acetaminophen  5-325 MG Oral Tab Take 1-2 tablets by mouth. (Patient not taking: Reported on 1/3/2022)      Ascorbic Acid (VITAMIN C) 1000 MG Oral Tab Take 1,000 mg by mouth daily. (Patient not taking: Reported on 1/3/2022)        Counseling given: Not Answered         Problem List:  Patient Active Problem List   Diagnosis    Controlled type 2 diabetes mellitus without complication, without long-term current use of insulin (HCC)    Seborrheic dermatitis    History of melanoma    Lower urinary tract symptoms (LUTS)    MRSA (methicillin resistant Staphylococcus aureus) colonization    Obesity, unspecified    ANIL (obstructive sleep apnea)    Rosacea    Esophageal dysphagia    Hypersomnia       REVIEW OF SYSTEMS:   Review of Systems    EXAM:   /80 (BP Location: Right arm, Patient Position: Sitting, Cuff Size: adult)   Pulse 102   Resp 18   Ht 6' (1.829 m)   Wt 278 lb (126.1 kg)   SpO2 99%   BMI 37.70 kg/m²  Estimated body mass index is 37.7 kg/m² as calculated from the following:    Height as of this encounter: 6' (1.829 m).    Weight as of this encounter: 278 lb (126.1 kg).   Neck in inches:      Wt Readings from Last 6 Encounters:   01/23/24 278 lb (126.1 kg)   01/03/22 285 lb (129.3 kg)   02/05/21 267 lb 6.4 oz (121.3 kg)   01/20/21 179 lb (81.2 kg)   07/02/19 275 lb (124.7 kg)   06/19/19 275 lb (124.7 kg)     BP Readings from Last 3 Encounters:   01/23/24 128/80   01/03/22 130/80   02/05/21 138/76     Pulse Readings from Last 3 Encounters:   01/23/24 102   01/03/22 78   02/05/21 87     SpO2 Readings from Last 3 Encounters:   01/23/24 99%   01/03/22 99%   01/20/21 91%      Ideal body weight: 77.6 kg (171 lb 1.2 oz)  Adjusted ideal body weight: 97 kg (213 lb 13.5 oz)    Vital signs reviewed.  Physical Exam    ASSESSMENT AND PLAN:   1. ANIL (obstructive sleep apnea)  Experiencing hypersomnia  High leak, will evaluate mask and apparatus  Current setting is 12  Followup in 3 months  2. Hypersomnia  Re-assess after treatment  is optimized  3. Class 2 obesity due to excess calories without serious comorbidity with body mass index (BMI) of 37.0 to 37.9 in adult  addressed  4. Controlled type 2 diabetes mellitus without complication, without long-term current use of insulin (HCC)    5. Sleep deprivation   Addressed goal 7-9 hours    There are no Patient Instructions on file for this visit.    Independent interpretation of Sleep Download as defined above.  Continue with Rx management of Sleep apnea with PAP therapy.    COMPLIANCE is required by insurance for 4 hours a night most nights of the week.    Advised if still with sleep apnea and not using CPAP has a 7 fold increase in risk of heart attack, stroke, abnormal heart rhythm  and death,  increased risk of driving accidents.     Advised to refrain from driving when sleepy.      Recommend weight loss, and maintain and optimal BMI with Exercise 30 minutes most days to target heart rate .     Advised patient to change filters,masks,hoses  and tubes and equiptment on a  regular schedule.    Filters and seals shall be changed every 1 month,  Hoses every 3 months,   CPAP mask and humidifier chamber changed every 6 month  with the durable medical equipment provider.         Meds & Refills for this Visit:  Requested Prescriptions      No prescriptions requested or ordered in this encounter       Outcome: Parent verbalizes understanding. Parent is notified to call with any questions, complications, allergies, or worsening or changing symptoms.  Parent is to call with any side effects or complications from the treatments as a result of today.     \" This note was created utilizing Dragon speech recognition software.  Please excuse any grammatical errors. Call my office if you have any questions regarding this note. \"     Reji Marinelli,   1/23/2024  10:52 AM

## 2024-04-09 PROBLEM — L03.031 PARONYCHIA OF GREAT TOE OF RIGHT FOOT: Status: ACTIVE | Noted: 2023-02-09

## 2024-04-09 PROBLEM — K22.70 BARRETT'S ESOPHAGUS WITHOUT DYSPLASIA: Status: ACTIVE | Noted: 2019-09-30

## 2024-05-01 ENCOUNTER — LABORATORY ENCOUNTER (OUTPATIENT)
Dept: LAB | Age: 54
End: 2024-05-01
Attending: FAMILY MEDICINE
Payer: COMMERCIAL

## 2024-05-01 DIAGNOSIS — Z86.14 HISTORY OF MRSA INFECTION: ICD-10-CM

## 2024-05-01 PROCEDURE — 87081 CULTURE SCREEN ONLY: CPT

## 2024-05-02 ENCOUNTER — LAB ENCOUNTER (OUTPATIENT)
Dept: LAB | Age: 54
End: 2024-05-02
Attending: FAMILY MEDICINE
Payer: COMMERCIAL

## 2024-05-02 DIAGNOSIS — Z86.14 HISTORY OF MRSA INFECTION: ICD-10-CM

## 2024-05-02 PROCEDURE — 87081 CULTURE SCREEN ONLY: CPT

## 2024-05-03 ENCOUNTER — LAB ENCOUNTER (OUTPATIENT)
Dept: LAB | Age: 54
End: 2024-05-03
Attending: FAMILY MEDICINE
Payer: COMMERCIAL

## 2024-05-03 DIAGNOSIS — Z86.14 HISTORY OF MRSA INFECTION: ICD-10-CM

## 2024-05-03 PROCEDURE — 87081 CULTURE SCREEN ONLY: CPT

## 2024-05-06 NOTE — PROGRESS NOTES
I have reviewed the results. 3rd MRSA swab negative. Can schedule scopes at [facility]    Please call patient and schedule :    Ordering Provider: Soni  Provider to be scheduled with: Dr. Green  Procedure: EGD and Colonoscopy  Location: [facility]  Prep (if appropriate): PEG  Diagnosis: Gastric intestinal metaplasia; encounter for screening colonoscopy  Clearance(s):N/A  Clearance(s) expiration date:N/A  Mobile Clearance with date: N/A  (Please put in appt notes okay per Mobile)

## 2024-07-01 ENCOUNTER — OFFICE VISIT (OUTPATIENT)
Facility: CLINIC | Age: 54
End: 2024-07-01
Payer: COMMERCIAL

## 2024-07-01 VITALS
BODY MASS INDEX: 37.52 KG/M2 | DIASTOLIC BLOOD PRESSURE: 70 MMHG | WEIGHT: 277 LBS | RESPIRATION RATE: 16 BRPM | OXYGEN SATURATION: 99 % | HEART RATE: 86 BPM | SYSTOLIC BLOOD PRESSURE: 132 MMHG | HEIGHT: 72 IN

## 2024-07-01 DIAGNOSIS — G47.33 OBSTRUCTIVE SLEEP APNEA: Primary | ICD-10-CM

## 2024-07-01 DIAGNOSIS — E66.09 CLASS 2 OBESITY DUE TO EXCESS CALORIES WITHOUT SERIOUS COMORBIDITY WITH BODY MASS INDEX (BMI) OF 37.0 TO 37.9 IN ADULT: ICD-10-CM

## 2024-07-01 DIAGNOSIS — G47.10 HYPERSOMNIA: ICD-10-CM

## 2024-07-01 PROCEDURE — 99214 OFFICE O/P EST MOD 30 MIN: CPT | Performed by: OTHER

## 2024-07-01 NOTE — PROGRESS NOTES
[facility]  SLEEP PROGRESS NOTE        HPI:   This is a 53 year old male coming in for   Chief Complaint   Patient presents with    Obstructive Sleep Apnea (ANIL)     Dme: [employer] - device >5yrs (no longer transmitting)  Mask: Full Face       HPI:   Still having leaks and that is his main issue  Still tired during the day  Avg hours 6, 47min    CHEPE PEREIRA  2024 - 2024  Patient ID: [id number]  : 1970  Age: 53 years  [facility]  Compliance Report  Usage 2024 - 2024  Usage days 90/90 days (100%)  >= 4 hours 86 days (96%)  < 4 hours 4 days (4%)  Usage hours 610 hours 34 minutes  Average usage (total days) 6 hours 47 minutes  Average usage (days used) 6 hours 47 minutes  Median usage (days used) 6 hours 53 minutes  AirSense 10 AutoSet  Serial number [id number]  Mode CPAP  Set pressure 12 cmH2O  EPR Fulltime  EPR level 2  Therapy  Leaks - L/min Median: 15.4 95th percentile: 63.7 Maximum: 91.5  Events per hour AI: 1.0 HI: 0.2 AHI: 1.2  Apnea Index Central: 0.1 Obstructive: 0.2 Unknown: 0.6  RERA Index 0.2  Cheyne-Talamantes respiration (average duration per night) 0 minutes (0%)    Patient: Sleep review of systems today: see form.      Pt  PCP:  No primary care provider on file.  No referring provider defined for this encounter.           No data to display                    Past Medical History:    Broken nose    Diabetes (HCC)    Diabetes mellitus (HCC)    Eye disease    Fatigue    Head trauma    Kidney calculi    Melanoma (HCC)    back    Myopic degeneration, bilateral    Sleep apnea    Stress    Wears glasses     Past Surgical History:   Procedure Laterality Date    Hernia surgery      Other      Melanoma removed from center of back     Other      Sedated to set nasal fracture     Social History:  Social History     Social History Narrative    Not on file     Social History     Socioeconomic History    Marital status:    Tobacco Use    Smoking status: Never    Smokeless  tobacco: Never   Substance and Sexual Activity    Alcohol use: Never    Drug use: Never     Family History:  Family History   Problem Relation Age of Onset    Diabetes Father     Hypertension Father     Diabetes Mother     Breast Cancer Sister      Allergies:  Allergies   Allergen Reactions    Penicillins UNKNOWN    Bacitracin RASH    Balsam RASH     Balsam of [place]    Sodium Metabisulfite RASH     Current Meds:  Current Outpatient Medications   Medication Sig Dispense Refill    allopurinol 100 MG Oral Tab Take 1 tablet (100 mg total) by mouth daily.      ketoconazole 2 % External Shampoo       JANUVIA 100 MG Oral Tab Take 1 tablet (100 mg total) by mouth daily.      tadalafil 5 MG Oral Tab Take 1 tablet (5 mg total) by mouth daily.      polyethylene glycol, PEG 3350-KCl-NaBcb-NaCl-NaSulf, 236 g Oral Recon Soln Take as directed by provider 4000 mL 0    glipiZIDE ER 10 MG Oral Tablet 24 Hr Take 1 tablet (10 mg total) by mouth daily.      Glucose Blood (BLOOD GLUCOSE TEST) In Vitro Strip 1 each by Does not apply route.      METFORMIN HCL OR Take 750 mg by mouth 2 (two) times daily. 2 tabs in the am and I tab at dinner       tamsulosin HCl 0.4 MG Oral Cap Take 1 capsule (0.4 mg total) by mouth daily.      Apremilast (OTEZLA OR) Take by mouth.        Counseling given: Not Answered         Problem List:  Patient Active Problem List   Diagnosis    Controlled type 2 diabetes mellitus without complication, without long-term current use of insulin (HCC)    Seborrheic dermatitis    History of melanoma    Lower urinary tract symptoms (LUTS)    MRSA (methicillin resistant Staphylococcus aureus) colonization    Obesity, unspecified    Obstructive sleep apnea    Rosacea    Esophageal dysphagia    Hypersomnia    Alfonso's esophagus without dysplasia    Paronychia of great toe of right foot       REVIEW OF SYSTEMS:   Review of Systems    EXAM:   /70 (BP Location: Right arm, Patient Position: Sitting, Cuff Size: adult)   Pulse  86   Resp 16   Ht 6' (1.829 m)   Wt 277 lb (125.6 kg)   SpO2 99%   BMI 37.57 kg/m²  Estimated body mass index is 37.57 kg/m² as calculated from the following:    Height as of this encounter: 6' (1.829 m).    Weight as of this encounter: 277 lb (125.6 kg).   Neck in inches:      Wt Readings from Last 6 Encounters:   07/01/24 277 lb (125.6 kg)   04/09/24 273 lb (123.8 kg)   01/23/24 278 lb (126.1 kg)   01/03/22 285 lb (129.3 kg)   02/05/21 267 lb 6.4 oz (121.3 kg)   01/20/21 179 lb (81.2 kg)     BP Readings from Last 3 Encounters:   07/01/24 132/70   04/09/24 142/82   01/23/24 128/80     Pulse Readings from Last 3 Encounters:   07/01/24 86   04/09/24 93   01/23/24 102     SpO2 Readings from Last 3 Encounters:   07/01/24 99%   01/23/24 99%   01/03/22 99%      Ideal body weight: 77.6 kg (171 lb 1.2 oz)  Adjusted ideal body weight: 96.8 kg (213 lb 7.1 oz)    Vital signs reviewed.  Physical Exam    ASSESSMENT AND PLAN:   1. Obstructive sleep apnea  Working on mask fitting  Saw [facility] but still an issue  Will see our RT for mask refit  Consider testing for hypersomnia  2. 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 Class 2 obesity due to excess calories without serious comorbidity with body mass index (BMI) of 37.0 to 37.9 in adult    There are no Patient Instructions on file for this visit.    Independent interpretation of Sleep Download as defined above.  Continue with Rx management of Sleep apnea with PAP therapy.    COMPLIANCE is required by insurance for 4 hours a night most nights of the week.    Advised if still with sleep apnea and not using CPAP has a 7 fold increase in risk of heart attack, stroke, abnormal heart rhythm  and death,  increased risk of driving accidents.     Advised to refrain from driving when sleepy.      Recommend weight loss, and maintain and optimal BMI with Exercise 30 minutes most days to target heart rate .     Advised patient to change filters,masks,hoses  and tubes and  equiptment on a  regular schedule.    Filters and seals shall be changed every 1 month,  Hoses every 3 months,   CPAP mask and humidifier chamber changed every 6 month  with the durable medical equipment provider.         Meds & Refills for this Visit:  Requested Prescriptions      No prescriptions requested or ordered in this encounter       Outcome: Parent verbalizes understanding. Parent is notified to call with any questions, complications, allergies, or worsening or changing symptoms.  Parent is to call with any side effects or complications from the treatments as a result of today.     \" This note was created utilizing Dragon speech recognition software.  Please excuse any grammatical errors. Call my office if you have any questions regarding this note. \"     Reji Marinelli,   7/1/2024  12:45 PM

## 2024-07-16 ENCOUNTER — NURSE ONLY (OUTPATIENT)
Facility: CLINIC | Age: 54
End: 2024-07-16
Payer: COMMERCIAL

## 2024-07-16 PROCEDURE — 94660 CPAP INITIATION&MGMT: CPT | Performed by: OTHER

## 2024-07-16 NOTE — PROGRESS NOTES
Pt using Airtouch F20 large , dl shows high leak.   Upon examination of mask, it appears that cushion is ripped which would cause high leakage.   Pt fitted with F&P VITERA LARGE FFM which fit well.   Pt was able to demonstrate on how to properly put on  Vitera FFM and how to adjust the headgear.  Pt instructed  on proper cleaning and maintenance of VITERA FFM.   Instructed pt to update the DME company with the type of mask pt will be using so that DME will send  the right mask in pt's  next cpap re supply shipment.  Pt verbalized understanding of all instructions.        Usage 04/18/2024 - 07/16/2024  Usage days 89/90 days (99%)  >= 4 hours 85 days (94%)  < 4 hours 4 days (4%)  Usage hours 595 hours 53 minutes  Average usage (total days) 6 hours 37 minutes  Average usage (days used) 6 hours 42 minutes  Median usage (days used) 6 hours 50 minutes  AirSense 10 AutoSet  Serial number [id number]  Mode CPAP  Set pressure 12 cmH2O  EPR Fulltime  EPR level 2  Therapy  Leaks - L/min Median: 20.5 95th percentile: 71.5 Maximum: 93.3  Events per hour AI: 1.2 HI: 0.2 AHI: 1.4  Apnea Index Central: 0.1 Obstructive: 0.2 Unknown: 0.9  RERA Index 0.2  Cheyne-Talamantes respiration (average duration per night) 0 minutes (0%)

## 2024-07-30 DIAGNOSIS — L40.9 PSORIASIS: Primary | ICD-10-CM

## 2024-08-21 PROBLEM — K31.A19 GASTRIC INTESTINAL METAPLASIA WITHOUT DYSPLASIA, UNSPECIFIED SITE: Status: ACTIVE | Noted: 2024-08-21

## 2024-08-21 PROBLEM — Z12.11 SPECIAL SCREENING FOR MALIGNANT NEOPLASM OF COLON: Status: ACTIVE | Noted: 2024-08-21

## 2025-02-03 DIAGNOSIS — L40.9 PSORIASIS: Primary | ICD-10-CM

## 2025-02-27 ENCOUNTER — HOSPITAL ENCOUNTER (OUTPATIENT)
Age: 55
Discharge: HOME | End: 2025-02-27
Payer: COMMERCIAL

## 2025-02-27 ENCOUNTER — APPOINTMENT (OUTPATIENT)
Dept: GENERAL RADIOLOGY | Age: 55
End: 2025-02-27
Attending: NURSE PRACTITIONER
Payer: COMMERCIAL

## 2025-02-27 VITALS
SYSTOLIC BLOOD PRESSURE: 143 MMHG | OXYGEN SATURATION: 97 % | TEMPERATURE: 98 F | RESPIRATION RATE: 14 BRPM | HEART RATE: 93 BPM | DIASTOLIC BLOOD PRESSURE: 84 MMHG

## 2025-02-27 DIAGNOSIS — S61.451A DOG BITE OF RIGHT HAND WITH INFECTION, INITIAL ENCOUNTER: Primary | ICD-10-CM
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
## 2025-02-27 DIAGNOSIS — L08.9 DOG BITE OF RIGHT HAND WITH INFECTION, INITIAL ENCOUNTER: Primary | ICD-10-CM

## 2025-02-27 PROCEDURE — 99215 OFFICE O/P EST HI 40 MIN: CPT

## 2025-02-27 PROCEDURE — 73130 X-RAY EXAM OF HAND: CPT | Performed by: NURSE PRACTITIONER

## 2025-02-27 PROCEDURE — 96374 THER/PROPH/DIAG INJ IV PUSH: CPT

## 2025-02-27 PROCEDURE — 99204 OFFICE O/P NEW MOD 45 MIN: CPT

## 2025-02-27 RX ORDER — GLIPIZIDE 10 MG/1
10 TABLET, FILM COATED, EXTENDED RELEASE ORAL DAILY
Qty: 30 TABLET | Refills: 0 | Status: SHIPPED | OUTPATIENT
Start: 2025-02-27

## 2025-02-27 RX ORDER — GLIPIZIDE 5 MG/1
5 TABLET ORAL
COMMUNITY

## 2025-02-27 RX ORDER — METRONIDAZOLE 500 MG/1
500 TABLET ORAL 3 TIMES DAILY
Qty: 21 TABLET | Refills: 0 | Status: SHIPPED | OUTPATIENT
Start: 2025-02-27 | End: 2025-03-06

## 2025-02-27 RX ORDER — CEFADROXIL 500 MG/1
500 CAPSULE ORAL 2 TIMES DAILY
Qty: 14 CAPSULE | Refills: 0 | Status: SHIPPED | OUTPATIENT
Start: 2025-02-27 | End: 2025-03-06

## 2025-02-27 RX ORDER — METFORMIN HYDROCHLORIDE 750 MG/1
1 TABLET ORAL 2 TIMES DAILY
Qty: 60 TABLET | Refills: 0 | Status: SHIPPED | OUTPATIENT
Start: 2025-02-27 | End: 2025-03-29

## 2025-02-27 NOTE — DISCHARGE INSTRUCTIONS
Please take antibiotics as prescribed.  Close follow-up with hand surgery as discussed.  ER precautions.

## 2025-02-27 NOTE — ED PROVIDER NOTES
Patient Seen in: [facility]      History     Chief Complaint   Patient presents with    Bite     Stated Complaint: Dog bites    Subjective:   This is a 54-year-old male with below stated medical history.  Presents to immediate care for dog bite to the dorsal side of the right hand.  Reports he was bit last Thursday.  Area has become more swollen, painful, with spreading redness.  States he was able to squeeze pus from the area.  Patient states he has been out of his oral diabetes medicine for a few months due to issues with the primary.  He is not scheduled to see a new primary until March 19.  No fevers.  Last tetanus was 2020.  Dog was up-to-date with vaccinations.  No treatment attempted prior to arrival.     The history is provided by the patient.             Objective:     Past Medical History:    Bleeding nose    Broken nose    Calculus of kidney    Diabetes (HCC)    Diabetes mellitus (HCC)    Disorder of prostate    Eye disease    Fatigue    Flatulence/gas pain/belching    Head trauma    Itch of skin    Kidney calculi    Leaking of urine    Melanoma (HCC)    back    Myopic degeneration, bilateral    Sleep apnea    Stress    Wears glasses              Past Surgical History:   Procedure Laterality Date    Hernia surgery      Other      Melanoma removed from center of back     Other      Sedated to set nasal fracture                Social History     Socioeconomic History    Marital status:    Tobacco Use    Smoking status: Never    Smokeless tobacco: Never   Substance and Sexual Activity    Alcohol use: Never    Drug use: Never              Review of Systems   Constitutional:  Negative for chills and fever.   HENT:  Negative for congestion and sore throat.    Respiratory:  Negative for cough, shortness of breath and wheezing.    Cardiovascular:  Negative for chest pain, palpitations and leg swelling.   Gastrointestinal:  Negative for abdominal pain, constipation, diarrhea, nausea and  vomiting.   Musculoskeletal:  Negative for back pain, neck pain and neck stiffness.   Skin:  Positive for wound.   Neurological:  Negative for numbness.       Positive for stated complaint: Dog bites  Other systems are as noted in HPI.  Constitutional and vital signs reviewed.      All other systems reviewed and negative except as noted above.    Physical Exam     ED Triage Vitals [02/27/25 1219]   /84   Pulse 93   Resp 14   Temp 98 °F (36.7 °C)   Temp src Oral   SpO2 97 %   O2 Device None (Room air)       Current Vitals:   Vital Signs  BP: 143/84  Pulse: 93  Resp: 14  Temp: 98 °F (36.7 °C)  Temp src: Oral    Oxygen Therapy  SpO2: 97 %  O2 Device: None (Room air)        Physical Exam  Vitals and nursing note reviewed.   Constitutional:       General: He is not in acute distress.     Appearance: Normal appearance. He is not ill-appearing, toxic-appearing or diaphoretic.   HENT:      Head: Normocephalic and atraumatic.      Right Ear: External ear normal.      Left Ear: External ear normal.      Nose: Nose normal.      Mouth/Throat:      Mouth: Mucous membranes are moist.      Pharynx: Oropharynx is clear.   Eyes:      General:         Right eye: No discharge.         Left eye: No discharge.      Extraocular Movements: Extraocular movements intact.      Conjunctiva/sclera: Conjunctivae normal.   Cardiovascular:      Rate and Rhythm: Normal rate.   Pulmonary:      Effort: Pulmonary effort is normal.   Musculoskeletal:      Cervical back: Neck supple.      Right lower leg: No edema.      Left lower leg: No edema.   Skin:     General: Skin is warm and dry.      Capillary Refill: Capillary refill takes less than 2 seconds.      Findings: Erythema present. No rash.   Neurological:      Mental Status: He is alert and oriented to person, place, and time.   Psychiatric:         Mood and Affect: Mood normal.         Behavior: Behavior normal.                 ED Course   Labs Reviewed - No data to display         Xray  right hand, dose of abt, wound care       MDM        Vital signs stable.  Patient is well-appearing and nontoxic looking presents to immediate care for dog bite to the right hand.    Differential diagnosis includes but is not limited to dog bite, cellulitis, abscess, tenosynovitis, osteomyelitis    Patient reports he has been out of his diabetes medicine for the last few months and is not scheduled to follow-up with the primary till March 19.    X-ray films interpreted and reviewed myself.  Results show soft tissue swelling with no evidence of injury to the bone or osteomyelitis.  No evidence of abscess on exam.  Patient is able to flex the middle digit at the MCP, PIP, DIP joints.  Low suspicion for tenosynovitis.      Patient was given 2 g of Ancef here in the clinic. No evidence of allergic reaction or anaphylaxis.    DC home.  Course of Duricef and Flagyl prescribed.  Wound instructions including signs of worsening infection discussed in detail.  Recommended close follow-up with hand surgery.  ER precautions discussed in detail.  Patient verbalized understanding, and agreed with plan of care.  All questions answered.    Patient was given refills of 1 month of his oral diabetes medications.    My attending physician Dr. MICHEL Olguin was directly involved in this case.       Medical Decision Making      Disposition and Plan     Clinical Impression:  1. Dog bite of right hand with infection, initial encounter         Disposition:  Discharge  2/27/2025  3:11 pm    Follow-up:  Vesta Roger PA  [address] [place] [id number]  [phone]    In 3 days            Medications Prescribed:  Current Discharge Medication List        START taking these medications    Details   !! SITagliptin Phosphate (JANUVIA) 100 MG Oral Tab Take 1 tablet (100 mg total) by mouth daily.  Qty: 30 tablet, Refills: 0      !! glipiZIDE ER 10 MG Oral Tablet 24 Hr Take 1 tablet (10 mg total) by mouth daily.  Qty: 30 tablet,  Refills: 0      !! metFORMIN HCl 750 MG Oral Tab Take 1 tablet by mouth in the morning and 1 tablet before bedtime.  Qty: 60 tablet, Refills: 0      metroNIDAZOLE 500 MG Oral Tab Take 1 tablet (500 mg total) by mouth 3 (three) times daily for 7 days.  Qty: 21 tablet, Refills: 0      cefadroxil 500 MG Oral Cap Take 1 capsule (500 mg total) by mouth 2 (two) times daily for 7 days.  Qty: 14 capsule, Refills: 0       !! - Potential duplicate medications found. Please discuss with provider.              Supplementary Documentation:

## 2025-02-27 NOTE — ED INITIAL ASSESSMENT (HPI)
Dog bites/scratches on bilateral hands while trying to break up fight between dogs on Thursday. Cleansed with hydrogen peroxide and iodine, has been applying mupirocin ointment since.     Left hand with two lacs - thumb and middle finger  Right hand with two lacs - one between thumb and index finger; one on top of hand at first knuckle of fourth finger, + swelling/erythema

## 2025-03-03 ENCOUNTER — OFFICE VISIT (OUTPATIENT)
Dept: ORTHOPEDICS CLINIC | Facility: CLINIC | Age: 55
End: 2025-03-03
Payer: COMMERCIAL

## 2025-03-03 VITALS — HEIGHT: 72 IN | WEIGHT: 277 LBS | BODY MASS INDEX: 37.52 KG/M2

## 2025-03-03 DIAGNOSIS — S61.451A DOG BITE OF RIGHT HAND, INITIAL ENCOUNTER: Primary | ICD-10-CM
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
## 2025-03-03 PROCEDURE — 99203 OFFICE O/P NEW LOW 30 MIN: CPT | Performed by: PHYSICIAN ASSISTANT

## 2025-03-03 RX ORDER — CIPROFLOXACIN 500 MG/1
500 TABLET, FILM COATED ORAL 2 TIMES DAILY
Qty: 28 TABLET | Refills: 0 | Status: SHIPPED | OUTPATIENT
Start: 2025-03-03 | End: 2025-03-17

## 2025-03-03 NOTE — H&P
Clinic Note [facility]     Assessment/Plan:  54 year old male    Right hand dog bite with cellulitis-DOI 2/20/2025-we will switch antibiotic to ciprofloxacin.  He will hold off from peroxide.  We discussed wound care.  We will have him return on Friday for wound check and if no significant improvement we will likely order an MRI discussed possibility of needing it washed out.  All of his questions were answered. Patient was seen in conjunction with Dr Guerrero    Follow Up: This Friday for wound check    Diagnostic Studies:  X-rays are negative for acute fracture.    Physical Exam:    Ht 6' (1.829 m)   Wt 277 lb (125.6 kg)   BMI 37.57 kg/m²     Constitutional: NAD. AOx3. Well-developed and Well-nourished.   Psychiatric: Normal mood/ affect/ behavior. Judgment and thought content normal.     Right upper Extremity:   Inspection: Superficial wound with some serosanguineous drainage and surrounding erythema along the dorsal ring and middle finger MCP joints.  No significant worsening or drainage since previous photos on 2/27   Palpation: Tender along the wound but no significant pain with passive motion of the metacarpal joints.   Motion: Elbow: normal bilateral symmetric ext/flex  Wrist: normal bilateral symmetric ext/flex/sup/pro  Finger: full composite fist       CC: Hand Injury (RT HAND; DOG BITE; DOI: 2/20/25)        HPI: This 54 year old male presents with complaints of a wound and pain to his right hand.  On 2/20/2025 he was breaking up a fight between his dogs and 1 bit the right hand.  He had other lacerations from this which have healed except for the one on his dorsal hand.  He went to urgent care on 2/27/2025 and they put him on antibiotics and told to follow-up orthopedics.  He states it has continued to cause pain and no significant improvement in redness but he has been treating it with hydroperoxide and dressing changes.    Past Medical History:    Bleeding nose    Broken nose    Calculus of kidney     Diabetes (HCC)    Diabetes mellitus (HCC)    Disorder of prostate    Eye disease    Fatigue    Flatulence/gas pain/belching    Head trauma    Itch of skin    Kidney calculi    Leaking of urine    Melanoma (HCC)    back    Myopic degeneration, bilateral    Sleep apnea    Stress    Wears glasses       Past Surgical History:   Procedure Laterality Date    Hernia surgery      Other      Melanoma removed from center of back     Other      Sedated to set nasal fracture       Current Outpatient Medications   Medication Sig Dispense Refill    ciprofloxacin 500 MG Oral Tab Take 1 tablet (500 mg total) by mouth 2 (two) times daily for 14 days. 28 tablet 0    glipiZIDE 5 MG Oral Tab Take 1 tablet (5 mg total) by mouth every morning before breakfast.      SITagliptin Phosphate (JANUVIA) 100 MG Oral Tab Take 1 tablet (100 mg total) by mouth daily. 30 tablet 0    glipiZIDE ER 10 MG Oral Tablet 24 Hr Take 1 tablet (10 mg total) by mouth daily. 30 tablet 0    metFORMIN HCl 750 MG Oral Tab Take 1 tablet by mouth in the morning and 1 tablet before bedtime. 60 tablet 0    metroNIDAZOLE 500 MG Oral Tab Take 1 tablet (500 mg total) by mouth 3 (three) times daily for 7 days. 21 tablet 0    cefadroxil 500 MG Oral Cap Take 1 capsule (500 mg total) by mouth 2 (two) times daily for 7 days. 14 capsule 0    metFORMIN 500 MG Oral Tab Take 3 tablets (1,500 mg total) by mouth daily with breakfast.      allopurinol 100 MG Oral Tab Take 1 tablet (100 mg total) by mouth daily.      ketoconazole 2 % External Shampoo       JANUVIA 100 MG Oral Tab Take 1 tablet (100 mg total) by mouth daily.      tadalafil 5 MG Oral Tab Take 1 tablet (5 mg total) by mouth daily.      polyethylene glycol, PEG 3350-KCl-NaBcb-NaCl-NaSulf, 236 g Oral Recon Soln Take as directed by provider 4000 mL 0    Glucose Blood (BLOOD GLUCOSE TEST) In Vitro Strip 1 each by Does not apply route.      tamsulosin HCl 0.4 MG Oral Cap Take 1 capsule (0.4 mg total) by mouth daily.       Apremilast (OTEZLA OR) Take by mouth.      glipiZIDE ER 10 MG Oral Tablet 24 Hr Take 1 tablet (10 mg total) by mouth daily. (Patient not taking: Reported on 3/3/2025)         Allergies[1]    Family History   Problem Relation Age of Onset    Diabetes Father     Hypertension Father     Diabetes Mother     Breast Cancer Sister        Social History     Occupational History    Not on file   Tobacco Use    Smoking status: Never    Smokeless tobacco: Never   Substance and Sexual Activity    Alcohol use: Never    Drug use: Never    Sexual activity: Not on file        Review of Systems (negative unless bolded):  General: fevers, chills, fatigue  CV:  chest pain, palpitations, leg swelling  Msk: bodyaches, neck pain, neck stiffness  Skin: rashes, open wounds, nonhealing ulcers  Hem: bleeds easily, bruise easily, immunocompromised  Neuro: dizziness, light headedness, headaches  Psych: anxious, depressed, anger issues  Vesta Roger PA-C  Hand, Wrist, & Elbow Surgery  Physician Assistant to Dr. [email or web address] [email or web address]  t: [phone]  f: [phone]         [1]   Allergies  Allergen Reactions    Penicillins UNKNOWN    Bacitracin RASH    Balsam RASH     Balsam of [place]    Sodium Metabisulfite RASH

## 2025-03-07 ENCOUNTER — OFFICE VISIT (OUTPATIENT)
Dept: ORTHOPEDICS CLINIC | Facility: CLINIC | Age: 55
End: 2025-03-07
Payer: COMMERCIAL

## 2025-03-07 VITALS — BODY MASS INDEX: 37.52 KG/M2 | WEIGHT: 277 LBS | HEIGHT: 72 IN
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
## 2025-03-07 DIAGNOSIS — S61.451A DOG BITE OF RIGHT HAND, INITIAL ENCOUNTER: Primary | ICD-10-CM

## 2025-03-07 PROCEDURE — 99213 OFFICE O/P EST LOW 20 MIN: CPT | Performed by: PHYSICIAN ASSISTANT

## 2025-03-07 NOTE — PROGRESS NOTES
Clinic Note [facility]     Assessment/Plan:  54 year old male    Right hand dog bite with cellulitis-DOI 2/20/2025-patient will continue with antibiotics for the next week.  He will begin therapy working range of motion exercises.  We discussed wound care.  All of his questions were answered.  Patient was seen in conjunction with Dr. Guerrero.    Follow Up: 1 week for wound check    Diagnostic Studies:  X-rays are negative for acute fracture.    Physical Exam:    Ht 6' (1.829 m)   Wt 277 lb (125.6 kg)   BMI 37.57 kg/m²     Constitutional: NAD. AOx3. Well-developed and Well-nourished.   Psychiatric: Normal mood/ affect/ behavior. Judgment and thought content normal.     Right upper Extremity:   Inspection: Continued erythema but no worsening small open area with serosanguineous drainage but no pus.   Palpation: Tender along the wound but no significant pain with passive motion of the metacarpal joints.   Motion: Elbow: normal bilateral symmetric ext/flex  Wrist: normal bilateral symmetric ext/flex/sup/pro  Finger: MF MCP: 20/60       CC: Follow - Up (RT HAND DOG BITE; WOUND CHECK/)        HPI: This 54 year old male presents with complaints of a wound and pain to his right hand.  On 2/20/2025 he was breaking up a fight between his dogs and 1 bit the right hand.  He had other lacerations from this which have healed except for the one on his dorsal hand.  He went to urgent care on 2/27/2025 and they put him on antibiotics and told to follow-up orthopedics.  He states it has continued to cause pain and no significant improvement in redness but he has been treating it with hydroperoxide and dressing changes.    Interval history: Patient's been taking the antibiotic and doing dressing changes.    Past Medical History:    Bleeding nose    Broken nose    Calculus of kidney    Diabetes (HCC)    Diabetes mellitus (HCC)    Disorder of prostate    Eye disease    Fatigue    Flatulence/gas pain/belching    Head trauma     Itch of skin    Kidney calculi    Leaking of urine    Melanoma (HCC)    back    Myopic degeneration, bilateral    Sleep apnea    Stress    Wears glasses       Past Surgical History:   Procedure Laterality Date    Hernia surgery      Other      Melanoma removed from center of back     Other      Sedated to set nasal fracture       Current Outpatient Medications   Medication Sig Dispense Refill    ciprofloxacin 500 MG Oral Tab Take 1 tablet (500 mg total) by mouth 2 (two) times daily for 14 days. 28 tablet 0    glipiZIDE 5 MG Oral Tab Take 1 tablet (5 mg total) by mouth every morning before breakfast.      SITagliptin Phosphate (JANUVIA) 100 MG Oral Tab Take 1 tablet (100 mg total) by mouth daily. 30 tablet 0    glipiZIDE ER 10 MG Oral Tablet 24 Hr Take 1 tablet (10 mg total) by mouth daily. 30 tablet 0    metFORMIN HCl 750 MG Oral Tab Take 1 tablet by mouth in the morning and 1 tablet before bedtime. 60 tablet 0    metFORMIN 500 MG Oral Tab Take 3 tablets (1,500 mg total) by mouth daily with breakfast.      allopurinol 100 MG Oral Tab Take 1 tablet (100 mg total) by mouth daily.      ketoconazole 2 % External Shampoo       JANUVIA 100 MG Oral Tab Take 1 tablet (100 mg total) by mouth daily.      tadalafil 5 MG Oral Tab Take 1 tablet (5 mg total) by mouth daily.      polyethylene glycol, PEG 3350-KCl-NaBcb-NaCl-NaSulf, 236 g Oral Recon Soln Take as directed by provider 4000 mL 0    Glucose Blood (BLOOD GLUCOSE TEST) In Vitro Strip 1 each by Does not apply route.      tamsulosin HCl 0.4 MG Oral Cap Take 1 capsule (0.4 mg total) by mouth daily.      Apremilast (OTEZLA OR) Take by mouth.      glipiZIDE ER 10 MG Oral Tablet 24 Hr Take 1 tablet (10 mg total) by mouth daily. (Patient not taking: Reported on 2/27/2025)         Allergies[1]    Family History   Problem Relation Age of Onset    Diabetes Father     Hypertension Father     Diabetes Mother     Breast Cancer Sister        Social History     Occupational History     Not on file   Tobacco Use    Smoking status: Never    Smokeless tobacco: Never   Substance and Sexual Activity    Alcohol use: Never    Drug use: Never    Sexual activity: Not on file        Review of Systems (negative unless bolded):  General: fevers, chills, fatigue  CV:  chest pain, palpitations, leg swelling  Msk: bodyaches, neck pain, neck stiffness  Skin: rashes, open wounds, nonhealing ulcers  Hem: bleeds easily, bruise easily, immunocompromised  Neuro: dizziness, light headedness, headaches  Psych: anxious, depressed, anger issues  Vesta Roger PA-C  Hand, Wrist, & Elbow Surgery  Physician Assistant to Dr. Dino [email or web address]  t: [phone]  f: [phone]         [1]   Allergies  Allergen Reactions    Penicillins UNKNOWN    Bacitracin RASH    Balsam RASH     Balsam of [place]    Sodium Metabisulfite RASH

## 2025-03-13 ENCOUNTER — TELEPHONE (OUTPATIENT)
Dept: ORTHOPEDICS CLINIC | Facility: CLINIC | Age: 55
End: 2025-03-13

## 2025-03-13 NOTE — TELEPHONE ENCOUNTER
Cyndi called to speak with RN. He states the Cipro antibiotic is causing him to feel nauseated and he has some trouble swallowing. He states he's vomited several times. Please call him today to advise what he should do.    Future Appointments   Date Time Provider Department Center   3/18/2025 10:30 AM Ari Jhaveri MD [facility] [facility]   3/21/2025  3:15 PM [id number]

## 2025-03-13 NOTE — TELEPHONE ENCOUNTER
Spoke with pt.  Dog bite wound.  On cipro , started shortly around 3/3/25 - is a 14 day supply.  Has not been tolerating. Takes with food, probiotic, but with nausea/vomiting and diarrhea.  Pt clarified that pill size makes pill also hard to swallow. No throat tightness.    Pt will be attaching image of wound. Reports is still \"oozing\".

## 2025-03-14 NOTE — TELEPHONE ENCOUNTER
Do you want a different antibiotic?  Please advise. Thank you!    Stopped taking the Cipro today because it was causing him nausea, vomited x 4 times and diarrhea.  Is there different   antibiotic.  Patient has 10 tabs (5 days) left so he has taken 9 days worth.  Patient states he feels much better after stopping the Cipro.  His wound is not oozing as much today.  Reviewed signs and symptoms of infection and to go to the ER or Urgent Care with any concerns.

## 2025-03-18 ENCOUNTER — OFFICE VISIT (OUTPATIENT)
Dept: INTERNAL MEDICINE CLINIC | Facility: CLINIC | Age: 55
End: 2025-03-18
Payer: COMMERCIAL

## 2025-03-18 ENCOUNTER — TELEPHONE (OUTPATIENT)
Dept: INTERNAL MEDICINE CLINIC | Facility: CLINIC | Age: 55
End: 2025-03-18

## 2025-03-18 VITALS
TEMPERATURE: 98 F | DIASTOLIC BLOOD PRESSURE: 70 MMHG | RESPIRATION RATE: 18 BRPM | HEIGHT: 71.85 IN | SYSTOLIC BLOOD PRESSURE: 118 MMHG | BODY MASS INDEX: 35.42 KG/M2 | OXYGEN SATURATION: 97 % | HEART RATE: 85 BPM | WEIGHT: 258.63 LBS

## 2025-03-18 DIAGNOSIS — G47.33 OBSTRUCTIVE SLEEP APNEA: ICD-10-CM

## 2025-03-18 DIAGNOSIS — Z86.0100 PERSONAL HISTORY OF COLON POLYPS, UNSPECIFIED: ICD-10-CM

## 2025-03-18 DIAGNOSIS — Z87.2 HISTORY OF PSORIASIS: ICD-10-CM

## 2025-03-18 DIAGNOSIS — S61.451A DOG BITE OF RIGHT HAND, INITIAL ENCOUNTER: ICD-10-CM

## 2025-03-18 DIAGNOSIS — R13.10 DYSPHAGIA, UNSPECIFIED TYPE: ICD-10-CM

## 2025-03-18 DIAGNOSIS — E11.65 TYPE 2 DIABETES MELLITUS WITH HYPERGLYCEMIA, WITHOUT LONG-TERM CURRENT USE OF INSULIN (HCC): Primary | ICD-10-CM
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
## 2025-03-18 DIAGNOSIS — Z85.820 HISTORY OF MELANOMA: ICD-10-CM

## 2025-03-18 DIAGNOSIS — K22.70 BARRETT'S ESOPHAGUS WITHOUT DYSPLASIA: ICD-10-CM

## 2025-03-18 DIAGNOSIS — R39.9 LOWER URINARY TRACT SYMPTOMS (LUTS): ICD-10-CM

## 2025-03-18 DIAGNOSIS — Z87.442 HISTORY OF KIDNEY STONES: ICD-10-CM

## 2025-03-18 PROCEDURE — 99203 OFFICE O/P NEW LOW 30 MIN: CPT | Performed by: FAMILY MEDICINE

## 2025-03-18 RX ORDER — GLIPIZIDE 5 MG/1
5 TABLET ORAL
Qty: 90 TABLET | Refills: 0 | Status: SHIPPED | OUTPATIENT
Start: 2025-03-18

## 2025-03-18 RX ORDER — PANTOPRAZOLE SODIUM 40 MG/1
40 TABLET, DELAYED RELEASE ORAL
Qty: 90 TABLET | Refills: 0 | Status: SHIPPED | OUTPATIENT
Start: 2025-03-18

## 2025-03-18 RX ORDER — APREMILAST 30 MG/1
TABLET, FILM COATED ORAL
COMMUNITY
Start: 2025-03-05 | End: 2025-03-18

## 2025-03-18 NOTE — TELEPHONE ENCOUNTER
Please advise on dosing for patients metformin- Pended for 3 tablets at breakfast- please advise if appropriate.

## 2025-03-18 NOTE — PROGRESS NOTES
Bucky Tinoco  11/11/1970    Chief Complaint   Patient presents with    Establish Care     Here to establish PCP       HPI:   Bucky Tinoco is a 54 year old male who presents to establish care. He has history of type II DM but has been out of medication for the last few months. He recently suffered a dog bite of his right hand and had refill of DM medications. He has history of kidney stones and ANIL on CPAP.     Current Outpatient Medications   Medication Sig Dispense Refill    glipiZIDE 5 MG Oral Tab Take 1 tablet (5 mg total) by mouth every morning before breakfast. 90 tablet 0    metFORMIN 500 MG Oral Tab Take 3 tablets (1,500 mg total) by mouth daily with breakfast. Take 2 tablets in the AM and 1 tablet in the evening with meals. 270 tablet 0    SITagliptin Phosphate (JANUVIA) 100 MG Oral Tab Take 1 tablet (100 mg total) by mouth daily. 90 tablet 0    pantoprazole 40 MG Oral Tab EC Take 1 tablet (40 mg total) by mouth every morning before breakfast. 90 tablet 0    allopurinol 100 MG Oral Tab Take 1 tablet (100 mg total) by mouth daily.      ketoconazole 2 % External Shampoo       tadalafil 5 MG Oral Tab Take 1 tablet (5 mg total) by mouth daily.      Glucose Blood (BLOOD GLUCOSE TEST) In Vitro Strip 1 each by Does not apply route.      tamsulosin HCl 0.4 MG Oral Cap Take 1 capsule (0.4 mg total) by mouth daily.      Apremilast (OTEZLA OR) Take by mouth.        Allergies[1]   Past Medical History:    Bleeding nose    Broken nose    Calculus of kidney    Diabetes (HCC)    Diabetes mellitus (HCC)    Disorder of prostate    Eye disease    Fatigue    Flatulence/gas pain/belching    Head trauma    Itch of skin    Kidney calculi    Leaking of urine    Melanoma (HCC)    back    Myopic degeneration, bilateral    Sleep apnea    Stress    Wears glasses      Patient Active Problem List   Diagnosis    Controlled type 2 diabetes mellitus without complication, without long-term current use of insulin (HCC)    Seborrheic  dermatitis    History of melanoma    Lower urinary tract symptoms (LUTS)    MRSA (methicillin resistant Staphylococcus aureus) colonization    Obesity, unspecified    Obstructive sleep apnea    Rosacea    Esophageal dysphagia    Hypersomnia    Alfonso's esophagus without dysplasia    Paronychia of great toe of right foot    Gastric intestinal metaplasia without dysplasia, unspecified site    Special screening for malignant neoplasm of colon    Personal history of colon polyps, unspecified      Past Surgical History:   Procedure Laterality Date    Hernia surgery      Other      Melanoma removed from center of back     Other      Sedated to set nasal fracture      Family History   Problem Relation Age of Onset    Diabetes Father     Hypertension Father     Diabetes Mother     Breast Cancer Sister       Social History     Socioeconomic History    Marital status:    Tobacco Use    Smoking status: Never     Passive exposure: Never    Smokeless tobacco: Never   Vaping Use    Vaping status: Never Used   Substance and Sexual Activity    Alcohol use: Never    Drug use: Never   Other Topics Concern    Special Diet No    Stress Concern No    Weight Concern No     Social Drivers of Health     Food Insecurity: No Food Insecurity (3/18/2025)    NCSS - Food Insecurity     Worried About Running Out of Food in the Last Year: No     Ran Out of Food in the Last Year: No   Transportation Needs: No Transportation Needs (3/18/2025)    NCSS - Transportation     Lack of Transportation: No   Housing Stability: Not At Risk (3/18/2025)    NCSS - Housing/Utilities     Has Housing: Yes     Worried About Losing Housing: No     Unable to Get Utilities: No         REVIEW OF SYSTEMS:   GENERAL: feels well otherwise, see HPI    EXAM:   /70 (BP Location: Right arm, Patient Position: Sitting, Cuff Size: large)   Pulse 85   Temp 97.6 °F (36.4 °C) (Temporal)   Resp 18   Ht 5' 11.85\" (1.825 m)   Wt 258 lb 9.6 oz (117.3 kg)   SpO2 97%    BMI 35.22 kg/m²   GENERAL: Well developed, well nourished,in no apparent distress  SKIN: erythema and swelling of dorsal R hand.   EYES: PERRLA, EOMI, conjunctiva are clear  HEENT: atraumatic, normocephalic  LUNGS: clear to auscultation  CARDIO: RRR  Bilateral barefoot skin diabetic exam is normal, visualized feet and the appearance is normal.  Bilateral monofilament/sensation of both feet is normal.  Pulsation pedal pulse exam of both lower legs/feet is normal as well.       ASSESSMENT AND PLAN:   Bucky Tinoco is a 54 year old male who presents to establish care.     Type 2 diabetes mellitus with hyperglycemia, without long-term current use of insulin (HCC)  He recently restarted treatment approximately a week ago after being off for a few months due to lack of PCP.  We will continue his current treatment for now and follow-up in 3 months with labs prior.  Will obtain eye exam records from Dr. Hawkins.   - glipiZIDE 5 MG Oral Tab; Take 1 tablet (5 mg total) by mouth every morning before breakfast.  Dispense: 90 tablet; Refill: 0  - metFORMIN 500 MG Oral Tab; Take 3 tablets (1,500 mg total) by mouth daily with breakfast. Take 2 tablets in the AM and 1 tablet in the evening with meals.  Dispense: 270 tablet; Refill: 0  - SITagliptin Phosphate (JANUVIA) 100 MG Oral Tab; Take 1 tablet (100 mg total) by mouth daily.  Dispense: 90 tablet; Refill: 0  - CMP in 3 months; Future  - Lipid in 3 months; Future  - Hemoglobin A1C in 3 months; Future  - Microalb/Creat Ratio, Random Urine in 3 months; Future    Alfonso's esophagus without dysplasia  Dysphagia, unspecified type  He endorses more dysphagia symptoms since his EGD last year.  Will start PPI and recommend follow-up with Dr. Green.  - pantoprazole 40 MG Oral Tab EC; Take 1 tablet (40 mg total) by mouth every morning before breakfast.  Dispense: 90 tablet; Refill: 0  - pantoprazole 40 MG Oral Tab EC; Take 1 tablet (40 mg total) by mouth every morning before  breakfast.  Dispense: 90 tablet; Refill: 0    Personal history of colon polyps, unspecified  Recall colonoscopy August of this year.  Will follow-up with Dr. Green.    Lower urinary tract symptoms (LUTS)  History of kidney stones  Following with outside urologist.  Overall stable on treatment.    Obstructive sleep apnea  Overall stable on CPAP.  Following with Dr. Marinelli    History of psoriasis  History of melanoma  Management per dermatology.    Dog bite of right hand, initial encounter  Has upcoming follow-up tomorrow with hand surgery.          All questions were answered and the patient agrees with the plan.     Thank you,  Ari Jhaveri MD         [1]   Allergies  Allergen Reactions    Ciprofloxacin NAUSEA AND VOMITING    Penicillins UNKNOWN    Bacitracin RASH    Balsam RASH     Balsam of [place]    Other RASH     N-isopropyl-N'-phenyl-p-phenylenediamine Sesquiterpene Lactone Mix fragrance mix ethyl acrylate    Sodium Metabisulfite RASH

## 2025-03-18 NOTE — TELEPHONE ENCOUNTER
Calling to clarify directions    Is patient taking 2 in the morning and 1 in the evening? Or if all 3 for breakfast?    Medication Quantity Refills Start End   metFORMIN 500 MG Oral Tab 270 tablet 0 3/18/2025 6/16/2025   Sig:   Take 3 tablets (1,500 mg total) by mouth daily with breakfast. Take 2 tablets in the AM and 1 tablet in the evening with meals.     Route:   Oral     Order #:   [id number]

## 2025-03-21 ENCOUNTER — OFFICE VISIT (OUTPATIENT)
Dept: ORTHOPEDICS CLINIC | Facility: CLINIC | Age: 55
End: 2025-03-21
Payer: COMMERCIAL

## 2025-03-21 VITALS — WEIGHT: 258 LBS | BODY MASS INDEX: 35.33 KG/M2 | HEIGHT: 71.5 IN
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
## 2025-03-21 DIAGNOSIS — S61.451A DOG BITE OF RIGHT HAND, INITIAL ENCOUNTER: Primary | ICD-10-CM

## 2025-03-21 PROCEDURE — 99213 OFFICE O/P EST LOW 20 MIN: CPT | Performed by: ORTHOPAEDIC SURGERY

## 2025-03-21 NOTE — PROGRESS NOTES
Clinic Note       Assessment/Plan:  54 year old male    Right hand dog bite with cellulitis-DOI 2/20/2025-continue home exercise program.  Would recommend covering the area of erythema when at work but can leave it open at other times.  The erythema will slowly improve with time and not representative of an active infection at this time.  Patient has reasonably good motion and improvement in clinical appearance of the hand.    Follow Up: 4 weeks for reevaluation    Diagnostic Studies:  X-rays are negative for acute fracture.    Physical Exam:    Ht 5' 11.5\" (1.816 m)   Wt 258 lb (117 kg)   BMI 35.48 kg/m²     Constitutional: NAD. AOx3. Well-developed and Well-nourished.   Psychiatric: Normal mood/ affect/ behavior. Judgment and thought content normal.     Right upper Extremity:   Inspection: Decreasing erythema   Palpation: Minimal tenderness around the area of erythema   Motion: Elbow: normal bilateral symmetric ext/flex  Wrist: normal bilateral symmetric ext/flex/sup/pro  Finger: Full composite fist with some mild MCP motion loss less than 10 degrees       CC: Follow - Up (RIGHT HAND DOG BITE; WOUND CHECK/)        HPI: This 54 year old male presents with complaints of a wound and pain to his right hand.  On 2/20/2025 he was breaking up a fight between his dogs and 1 bit the right hand.  He had other lacerations from this which have healed except for the one on his dorsal hand.  He went to urgent care on 2/27/2025 and they put him on antibiotics and told to follow-up orthopedics.  He states it has continued to cause pain and no significant improvement in redness but he has been treating it with hydroperoxide and dressing changes.    Interval history: Patient continues to note improvement in range of motion and pain.  The swelling has decreased as well.  No fevers or chills.    Past Medical History:    Bleeding nose    Broken nose    Calculus of kidney    Diabetes (HCC)    Diabetes mellitus (HCC)    Disorder of  prostate    Eye disease    Fatigue    Flatulence/gas pain/belching    Head trauma    Itch of skin    Kidney calculi    Leaking of urine    Melanoma (HCC)    back    Myopic degeneration, bilateral    Sleep apnea    Stress    Wears glasses       Past Surgical History:   Procedure Laterality Date    Hernia surgery      Other      Melanoma removed from center of back     Other      Sedated to set nasal fracture       Current Outpatient Medications   Medication Sig Dispense Refill    glipiZIDE 5 MG Oral Tab Take 1 tablet (5 mg total) by mouth every morning before breakfast. 90 tablet 0    SITagliptin Phosphate (JANUVIA) 100 MG Oral Tab Take 1 tablet (100 mg total) by mouth daily. 90 tablet 0    pantoprazole 40 MG Oral Tab EC Take 1 tablet (40 mg total) by mouth every morning before breakfast. 90 tablet 0    metFORMIN 500 MG Oral Tab Take 2 tablets by mouth in AM and 1 tablet in the PM with meals. 270 tablet 0    allopurinol 100 MG Oral Tab Take 1 tablet (100 mg total) by mouth daily.      ketoconazole 2 % External Shampoo       tadalafil 5 MG Oral Tab Take 1 tablet (5 mg total) by mouth daily.      Glucose Blood (BLOOD GLUCOSE TEST) In Vitro Strip 1 each by Does not apply route.      tamsulosin HCl 0.4 MG Oral Cap Take 1 capsule (0.4 mg total) by mouth daily.      Apremilast (OTEZLA OR) Take by mouth.         Allergies[1]    Family History   Problem Relation Age of Onset    Diabetes Father     Hypertension Father     Diabetes Mother     Breast Cancer Sister        Social History     Occupational History    Not on file   Tobacco Use    Smoking status: Never     Passive exposure: Never    Smokeless tobacco: Never   Vaping Use    Vaping status: Never Used   Substance and Sexual Activity    Alcohol use: Never    Drug use: Never    Sexual activity: Not on file        Review of Systems (negative unless bolded):  General: fevers, chills, fatigue  CV:  chest pain, palpitations, leg swelling  Msk: bodyaches, neck pain, neck  stiffness  Skin: rashes, open wounds, nonhealing ulcers  Hem: bleeds easily, bruise easily, immunocompromised  Neuro: dizziness, light headedness, headaches  Psych: anxious, depressed, anger issues    Dino Guerrero MD   Hand, Wrist, & Elbow Surgery  [email or web address]  t: [phone]  f: [phone]           [1]   Allergies  Allergen Reactions    Ciprofloxacin NAUSEA AND VOMITING    Penicillins UNKNOWN    Bacitracin RASH    Balsam RASH     Balsam of [place]    Other RASH     N-isopropyl-N'-phenyl-p-phenylenediamine Sesquiterpene Lactone Mix fragrance mix ethyl acrylate    Sodium Metabisulfite RASH

## 2025-05-19 ENCOUNTER — OFFICE VISIT (OUTPATIENT)
Dept: ORTHOPEDICS CLINIC | Facility: CLINIC | Age: 55
End: 2025-05-19
Payer: COMMERCIAL

## 2025-05-19 VITALS — BODY MASS INDEX: 35.33 KG/M2 | HEIGHT: 71.5 IN | WEIGHT: 258 LBS

## 2025-05-19 DIAGNOSIS — S61.451A DOG BITE OF RIGHT HAND, INITIAL ENCOUNTER: Primary | ICD-10-CM
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
## 2025-05-19 PROCEDURE — 99212 OFFICE O/P EST SF 10 MIN: CPT | Performed by: ORTHOPAEDIC SURGERY

## 2025-05-19 NOTE — PROGRESS NOTES
Clinic Note       Assessment/Plan:  54 year old male    Right hand dog bite with cellulitis-DOI 2/20/2025-patient has good MCP flexion and does have a slight 5 to 10 degree extensor lag probably secondary to EDC tendon adhesions.  At this point he is functional and has minimal limitations and thus we will observe.  It is possible that these adhesions breakdown over time and the extensor lag could improve.    Follow Up: 4 weeks for reevaluation    Diagnostic Studies:  X-rays are negative for acute fracture.    Physical Exam:    Ht 5' 11.5\" (1.816 m)   Wt 258 lb (117 kg)   BMI 35.48 kg/m²     Constitutional: NAD. AOx3. Well-developed and Well-nourished.   Psychiatric: Normal mood/ affect/ behavior. Judgment and thought content normal.     Right upper Extremity:   Inspection: Resolution of erythema.  Trace swelling around the MCP joint   Palpation: No significant tenderness around the MCP joint   Motion: Elbow: normal bilateral symmetric ext/flex  Wrist: normal bilateral symmetric ext/flex/sup/pro  Finger: Full composite fist with some mild MCP motion loss less than 10 degrees -extensor lag       CC: Follow - Up (RIGHT HAND DOG BITE; WOUND CHECK)        HPI: This 54 year old male presents with complaints of a wound and pain to his right hand.  On 2/20/2025 he was breaking up a fight between his dogs and 1 bit the right hand.  He had other lacerations from this which have healed except for the one on his dorsal hand.  He went to urgent care on 2/27/2025 and they put him on antibiotics and told to follow-up orthopedics.  He states it has continued to cause pain and no significant improvement in redness but he has been treating it with hydroperoxide and dressing changes.    Interval Hx (5/19/2025): Patient overall is doing well with no major functional limitations.  Very minimal intermittent discomfort.  He can feel that the movement is not quite normal..      Past Medical History:    Bleeding nose    Broken nose     Calculus of kidney    Diabetes (HCC)    Diabetes mellitus (HCC)    Disorder of prostate    Eye disease    Fatigue    Flatulence/gas pain/belching    Head trauma    Itch of skin    Kidney calculi    Leaking of urine    Melanoma (HCC)    back    Myopic degeneration, bilateral    Sleep apnea    Stress    Wears glasses       Past Surgical History:   Procedure Laterality Date    Hernia surgery      Other      Melanoma removed from center of back     Other      Sedated to set nasal fracture       Current Outpatient Medications   Medication Sig Dispense Refill    glipiZIDE 5 MG Oral Tab Take 1 tablet (5 mg total) by mouth every morning before breakfast. 90 tablet 0    SITagliptin Phosphate (JANUVIA) 100 MG Oral Tab Take 1 tablet (100 mg total) by mouth daily. 90 tablet 0    pantoprazole 40 MG Oral Tab EC Take 1 tablet (40 mg total) by mouth every morning before breakfast. 90 tablet 0    metFORMIN 500 MG Oral Tab Take 2 tablets by mouth in AM and 1 tablet in the PM with meals. 270 tablet 0    allopurinol 100 MG Oral Tab Take 1 tablet (100 mg total) by mouth daily.      ketoconazole 2 % External Shampoo       tadalafil 5 MG Oral Tab Take 1 tablet (5 mg total) by mouth daily.      Glucose Blood (BLOOD GLUCOSE TEST) In Vitro Strip 1 each by Does not apply route.      tamsulosin HCl 0.4 MG Oral Cap Take 1 capsule (0.4 mg total) by mouth daily.      Apremilast (OTEZLA OR) Take by mouth.         Allergies[1]    Family History   Problem Relation Age of Onset    Diabetes Father     Hypertension Father     Diabetes Mother     Breast Cancer Sister        Social History     Occupational History    Not on file   Tobacco Use    Smoking status: Never     Passive exposure: Never    Smokeless tobacco: Never   Vaping Use    Vaping status: Never Used   Substance and Sexual Activity    Alcohol use: Never    Drug use: Never    Sexual activity: Not on file        Review of Systems (negative unless bolded):  General: fevers, chills, fatigue  CV:   chest pain, palpitations, leg swelling  Msk: bodyaches, neck pain, neck stiffness  Skin: rashes, open wounds, nonhealing ulcers  Hem: bleeds easily, bruise easily, immunocompromised  Neuro: dizziness, light headedness, headaches  Psych: anxious, depressed, anger issues    Dino Guerrero MD   Hand, Wrist, & Elbow Surgery  [email or web address]  t: [phone]  f: [phone]           [1]   Allergies  Allergen Reactions    Ciprofloxacin NAUSEA AND VOMITING    Penicillins UNKNOWN    Bacitracin RASH    Balsam RASH     Balsam of [place]    Other RASH     N-isopropyl-N'-phenyl-p-phenylenediamine Sesquiterpene Lactone Mix fragrance mix ethyl acrylate    Sodium Metabisulfite RASH

## 2025-06-04 DIAGNOSIS — E11.65 TYPE 2 DIABETES MELLITUS WITH HYPERGLYCEMIA, WITHOUT LONG-TERM CURRENT USE OF INSULIN (HCC): ICD-10-CM

## 2025-06-04 RX ORDER — GLIPIZIDE 5 MG/1
5 TABLET ORAL
Qty: 90 TABLET | Refills: 0 | Status: SHIPPED | OUTPATIENT
Start: 2025-06-04

## 2025-06-09 DIAGNOSIS — E11.65 TYPE 2 DIABETES MELLITUS WITH HYPERGLYCEMIA, WITHOUT LONG-TERM CURRENT USE OF INSULIN (HCC): ICD-10-CM

## 2025-06-10 ENCOUNTER — TELEPHONE (OUTPATIENT)
Age: 55
End: 2025-06-10

## 2025-06-10 NOTE — TELEPHONE ENCOUNTER
Future Appointments   Date Time Provider Department Center   7/10/2025  9:30 AM Ari Jhaveri MD [facility]     Patient is scheduled for Annual Well Visit and Care Gaps will be addressed at that time.    Previsit Labs including A1C and Microalb previously ordered.    Immunizations update through [place] Immunizations Query.    [facility] message sent to remind patient of previsit Labs.  Last Accessed: 6/10/2025

## 2025-06-10 NOTE — TELEPHONE ENCOUNTER
Please review.  Protocol failed/has no protocol.    Last Office Visit: 03/18/2025  Please advise if refill is appropriate.My chart message sent to complete outstanding labs ordered 03/18/2025.   Requested Prescriptions     Pending Prescriptions Disp Refills    metFORMIN 500 MG Oral Tab [Pharmacy Med Name: metFORMIN HCl 500 MG Oral Tablet (GLUCOPHAGE)] 270 tablet 0     Sig: Take 2 tablets by mouth every morning AND 1 tablet every evening. Take with meals.

## 2025-08-08 ENCOUNTER — OFFICE VISIT (OUTPATIENT)
Age: 55
End: 2025-08-08

## 2025-08-08 ENCOUNTER — LAB ENCOUNTER (OUTPATIENT)
Dept: LAB | Age: 55
End: 2025-08-08
Attending: FAMILY MEDICINE

## 2025-08-08 VITALS — HEIGHT: 71.5 IN | WEIGHT: 276 LBS | BODY MASS INDEX: 37.79 KG/M2

## 2025-08-08 DIAGNOSIS — S61.451D DOG BITE OF RIGHT HAND, SUBSEQUENT ENCOUNTER: ICD-10-CM

## 2025-08-08 DIAGNOSIS — E11.9 TYPE 2 DIABETES MELLITUS WITHOUT COMPLICATION, WITHOUT LONG-TERM CURRENT USE OF INSULIN (HCC): Primary | ICD-10-CM

## 2025-08-08 DIAGNOSIS — Z86.0100 PERSONAL HISTORY OF COLON POLYPS, UNSPECIFIED: ICD-10-CM

## 2025-08-08 DIAGNOSIS — E11.65 TYPE 2 DIABETES MELLITUS WITH HYPERGLYCEMIA, WITHOUT LONG-TERM CURRENT USE OF INSULIN (HCC): ICD-10-CM
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
## 2025-08-08 DIAGNOSIS — K22.70 BARRETT'S ESOPHAGUS WITHOUT DYSPLASIA: ICD-10-CM

## 2025-08-08 DIAGNOSIS — R13.10 DYSPHAGIA, UNSPECIFIED TYPE: ICD-10-CM

## 2025-08-08 LAB
ALBUMIN SERPL-MCNC: 4.7 G/DL (ref 3.2–4.8)
ALBUMIN/GLOB SERPL: 1.8 (ref 1–2)
ALP LIVER SERPL-CCNC: 76 U/L (ref 45–117)
ALT SERPL-CCNC: 31 U/L (ref 10–49)
ANION GAP SERPL CALC-SCNC: 9 MMOL/L (ref 0–18)
AST SERPL-CCNC: 22 U/L (ref ?–34)
BILIRUB SERPL-MCNC: 0.4 MG/DL (ref 0.3–1.2)
BUN BLD-MCNC: 7 MG/DL (ref 9–23)
CALCIUM BLD-MCNC: 9.6 MG/DL (ref 8.7–10.6)
CHLORIDE SERPL-SCNC: 103 MMOL/L (ref 98–112)
CHOLEST SERPL-MCNC: 172 MG/DL (ref ?–200)
CO2 SERPL-SCNC: 26 MMOL/L (ref 21–32)
CREAT BLD-MCNC: 1.12 MG/DL (ref 0.7–1.3)
CREAT UR-SCNC: 257.4 MG/DL
EGFRCR SERPLBLD CKD-EPI 2021: 79 ML/MIN/1.73M2 (ref 60–?)
EST. AVERAGE GLUCOSE BLD GHB EST-MCNC: 166 MG/DL (ref 68–126)
FASTING PATIENT LIPID ANSWER: YES
FASTING STATUS PATIENT QL REPORTED: YES
GLOBULIN PLAS-MCNC: 2.6 G/DL (ref 2–3.5)
GLUCOSE BLD-MCNC: 148 MG/DL (ref 70–99)
HBA1C MFR BLD: 7.4 % (ref ?–5.7)
HDLC SERPL-MCNC: 42 MG/DL (ref 40–59)
LDLC SERPL CALC-MCNC: 108 MG/DL (ref ?–100)
MICROALBUMIN UR-MCNC: 1.5 MG/DL
MICROALBUMIN/CREAT 24H UR-RTO: 5.8 UG/MG (ref ?–30)
NONHDLC SERPL-MCNC: 130 MG/DL (ref ?–130)
OSMOLALITY SERPL CALC.SUM OF ELEC: 287 MOSM/KG (ref 275–295)
POTASSIUM SERPL-SCNC: 4.3 MMOL/L (ref 3.5–5.1)
PROT SERPL-MCNC: 7.3 G/DL (ref 5.7–8.2)
SODIUM SERPL-SCNC: 138 MMOL/L (ref 136–145)
TRIGL SERPL-MCNC: 124 MG/DL (ref 30–149)
VLDLC SERPL CALC-MCNC: 21 MG/DL (ref 0–30)

## 2025-08-08 PROCEDURE — 83036 HEMOGLOBIN GLYCOSYLATED A1C: CPT

## 2025-08-08 PROCEDURE — 82570 ASSAY OF URINE CREATININE: CPT

## 2025-08-08 PROCEDURE — 36415 COLL VENOUS BLD VENIPUNCTURE: CPT

## 2025-08-08 PROCEDURE — 90677 PCV20 VACCINE IM: CPT | Performed by: FAMILY MEDICINE

## 2025-08-08 PROCEDURE — 90471 IMMUNIZATION ADMIN: CPT | Performed by: FAMILY MEDICINE

## 2025-08-08 PROCEDURE — 80061 LIPID PANEL: CPT

## 2025-08-08 PROCEDURE — 80053 COMPREHEN METABOLIC PANEL: CPT

## 2025-08-08 PROCEDURE — 99214 OFFICE O/P EST MOD 30 MIN: CPT | Performed by: FAMILY MEDICINE

## 2025-08-08 PROCEDURE — 82043 UR ALBUMIN QUANTITATIVE: CPT

## 2025-08-08 RX ORDER — APREMILAST 30 MG/1
TABLET, FILM COATED ORAL
COMMUNITY
Start: 2025-07-07

## 2025-08-08 RX ORDER — PANTOPRAZOLE SODIUM 40 MG/1
40 TABLET, DELAYED RELEASE ORAL
Qty: 90 TABLET | Refills: 0 | Status: SHIPPED | OUTPATIENT
Start: 2025-08-08

## (undated) DEVICE — ENDOSCOPY PACK UPPER: Brand: MEDLINE INDUSTRIES, INC.

## (undated) DEVICE — FILTERLINE NASAL ADULT O2/CO2

## (undated) DEVICE — Device: Brand: DEFENDO AIR/WATER/SUCTION AND BIOPSY VALVE

## (undated) DEVICE — 1200CC GUARDIAN II: Brand: GUARDIAN

## (undated) DEVICE — FORCEP BIOPSY RJ4 LG CAP W/ND

## (undated) DEVICE — 3M™ RED DOT™ MONITORING ELECTRODE WITH FOAM TAPE AND STICKY GEL, 50/BAG, 20/CASE, 72/PLT 2570: Brand: RED DOT™

## (undated) NOTE — LETTER
Diabetes Retinal Eye Examination Report    Patient Name: Bucky Tinoco                                        : 1970    Referring Physician: Ari Jhaveri MD  _____________________________________________________________________________  Patient - Please bring this form to your next eye examination appointment.   Give it to your eye care professional to fill out and return via fax to your primary care provider.     Eye Care Professional - Please fill out this form and fax it back to the referring  primary care physician.   _____________________________________________________________________________     Date of Exam: ___/___/___    The patient received a dilated fundus examination with the following results:    ___ No diabetic retinopathy detected  ___ Background retinopathy was detected, but only requires monitoring  ___ Retinopathy requiring further testing and/or treatment was detected. See notes below.    Notes / Commentary / Recommendations:  _________________________________________________________________________________________________________________________________________________________________________________________________________________________________    The patient is to return for follow-up / evaluation in ____ months.    Eye Care Provider (Print): _______________________Signature___________________ Date ___ / ___/___    Clinic/Office name: _______________________Ph:_____________Fax:_____________

## (undated) NOTE — LETTER
Date: 7/10/2019  Patient Name:  Rachel Grier             Address: [address] [place] [id number]    : 1970    Dear Rachel Grier,    I hope this letter finds you doing well. I am writing to inform you of the following:        The results

## (undated) NOTE — LETTER
Date: 2024  Patient Name:  Bucky Tinoco             Address: [address] [facility] [address]    : 1970    Dear Bucky Tinoco,    Your third MRSA swab was negative. I will have the schedulers call you to schedule. Thanks for getting those done.  Please let me know if you have any questions or concerns.     Thank you,  Soni HIRSCH, [email or web address]

## (undated) NOTE — ED AVS SNAPSHOT
Sukumar Alonzo   MRN: [id number]    Department:  [facility] Emergency Department   Date of Visit:  6/16/2019           Disclosure     Insurance plans vary and the physician(s) referred by the ER may not be covered by your plan.  Please contact your tell this physician (or your personal doctor if your instructions are to return to your personal doctor) about any new or lasting problems. The primary care or specialist physician will see patients referred from the [facility] Emergency Department.  Estiven Hull